# Patient Record
Sex: FEMALE | Race: WHITE | NOT HISPANIC OR LATINO | URBAN - METROPOLITAN AREA
[De-identification: names, ages, dates, MRNs, and addresses within clinical notes are randomized per-mention and may not be internally consistent; named-entity substitution may affect disease eponyms.]

---

## 2023-03-28 DIAGNOSIS — R11.0 NAUSEA: Primary | ICD-10-CM

## 2023-03-28 PROBLEM — I10 ESSENTIAL HYPERTENSION: Status: ACTIVE | Noted: 2023-03-28

## 2023-03-28 PROBLEM — K30 FUNCTIONAL DYSPEPSIA: Status: ACTIVE | Noted: 2023-03-28

## 2023-03-28 RX ORDER — PROMETHAZINE HYDROCHLORIDE 25 MG/1
25 TABLET ORAL EVERY 6 HOURS PRN
COMMUNITY
End: 2023-03-28 | Stop reason: SDUPTHER

## 2023-03-28 RX ORDER — PROMETHAZINE HYDROCHLORIDE 25 MG/1
25 TABLET ORAL EVERY 6 HOURS PRN
Qty: 30 TABLET | Refills: 0 | Status: SHIPPED | OUTPATIENT
Start: 2023-03-28 | End: 2023-12-19 | Stop reason: WASHOUT

## 2023-03-28 NOTE — PROGRESS NOTES
Patient reports persistent nausea over the past week which started while she was taking Paxlovid.  She has been off the Paxlovid for about a week and continues to experience nausea and passage of 2 liquid stools per day.  I have prescribed promethazine 25 mg p.o. every 6 hours as needed for the nausea and also Kaopectate on a as needed basis for the diarrhea.  I also suggested that she provide a stool specimen to the laboratory.  At this time she wishes to hold off.  She will call me in 1 week with her condition or sooner if symptoms worsen

## 2023-07-10 DIAGNOSIS — E87.6 HYPOKALEMIA: Primary | ICD-10-CM

## 2023-07-10 PROBLEM — E78.5 HYPERLIPIDEMIA: Status: ACTIVE | Noted: 2023-07-10

## 2023-07-10 PROBLEM — E55.9 VITAMIN D DEFICIENCY: Status: ACTIVE | Noted: 2023-07-10

## 2023-07-10 PROBLEM — M79.7 FIBROMYALGIA: Status: ACTIVE | Noted: 2023-07-10

## 2023-07-10 PROBLEM — G47.00 INSOMNIA: Status: ACTIVE | Noted: 2023-07-10

## 2023-07-10 PROBLEM — M17.0 PRIMARY OSTEOARTHRITIS OF BOTH KNEES: Status: ACTIVE | Noted: 2023-07-10

## 2023-07-10 PROBLEM — K58.9 IRRITABLE BOWEL SYNDROME: Status: ACTIVE | Noted: 2023-07-10

## 2023-07-10 PROBLEM — J32.9 SINUSITIS: Status: ACTIVE | Noted: 2023-07-10

## 2023-07-10 PROBLEM — R73.03 PREDIABETES: Status: ACTIVE | Noted: 2023-07-10

## 2023-07-10 PROBLEM — J30.9 ALLERGIC RHINITIS: Status: ACTIVE | Noted: 2023-07-10

## 2023-07-10 RX ORDER — POTASSIUM CHLORIDE 20 MEQ/1
20 TABLET, EXTENDED RELEASE ORAL 2 TIMES DAILY
Qty: 60 TABLET | Refills: 2 | Status: SHIPPED | OUTPATIENT
Start: 2023-07-10 | End: 2023-10-25

## 2023-09-23 DIAGNOSIS — I10 ESSENTIAL HYPERTENSION: Primary | ICD-10-CM

## 2023-09-26 RX ORDER — HYDROCHLOROTHIAZIDE 25 MG/1
37.5 TABLET ORAL DAILY
Qty: 135 TABLET | Refills: 3 | Status: SHIPPED | OUTPATIENT
Start: 2023-09-26 | End: 2024-05-17 | Stop reason: DRUGHIGH

## 2023-10-23 DIAGNOSIS — E87.6 HYPOKALEMIA: ICD-10-CM

## 2023-10-25 RX ORDER — POTASSIUM CHLORIDE 20 MEQ/1
20 TABLET, EXTENDED RELEASE ORAL 2 TIMES DAILY
Qty: 60 TABLET | Refills: 2 | Status: SHIPPED | OUTPATIENT
Start: 2023-10-25 | End: 2024-02-02

## 2023-12-18 NOTE — PROGRESS NOTES
Subjective   Patient ID: Zuleima Recinos is a 68 y.o. female who presents with a history of pain in the right upper arm x 1 month    HPI   The patient reports a history of constant aching pain originating over the lateral surface right upper arm extending to the right fifth finger, the neck, and the left upper arm times a few months.  She reports an increase in the intensity the pain with any use of the right arm.  She reports no associated bilateral upper extremity weakness/paresthesias.  She reports no preceding trauma/overexertion.  No other associated symptoms.  The patient has used Advil, Aleve, Tylenol with no change in the frequency or intensity of pain.    She also reports a history of a cough productive of yellow sputum, postnasal drip times a few days.  She reports passage of softer stools once today.  She reports no other associated symptoms.  Review of Systems    Objective   There were no vitals taken for this visit.    Physical Exam  Head-palpation revealed tenderness over the maxillary sinuses bilaterally equally but not over the frontal sinuses  Nose-turbinates not erythematous or swollen, no septal deviation noted..  Mouth-posterior pharynx not erythematous, tonsillar pillars appeared normal, no exudates  Neck-no lymphadenopathy.  Lungs-clear.  Cardiac-rate normal, rhythm regular, no murmurs, no JVD.  Abdomen-soft, nondistended. Normal active bowel sounds. Palpation revealed no tenderness or masses    Musculoskeletal  Right shoulder-no erythema or swelling.  Full range of motion with increased intensity of pain in all directions of motion.  Palpation revealed increased tenderness over the entire shoulder and right upper arm regions, no increase in warmth.  Positive Lopez sign, positive arm crossover test, positive impingement sign, positive empty can test, positive Neer's sign, negative Yergason sign    Neurologic  Upper extremities:  Motor-strength 5/5 in all muscle groups  tested  Sensory  Light touch sensation fully intact  Pinprick sensation slightly diminished right upper arm  Reflexes-2+/4 bilaterally  Assessment/Plan        Assessment  Hypertension-diastolic blood pressure not at goal today  Cough, postnasal drip-May be secondary to viral syndrome, sinusitis, COVID-19  Constant aching pain originating over the lateral surface of the right upper arm extending to the right fifth finger, the neck, left upper arm-unsure of etiology.  May be secondary to bilateral cervical radiculopathies secondary to central canal stenosis cervical spine, bilateral neuroforaminal stenosis cervical spine.  Rotator cuff arthropathy I suppose is a possible etiology as well  Plan  Obtain x-rays of the cervical spine and right shoulder today.  I have urged the patient to take a rapid COVID test as soon as she arrives home today.  Increase hydrochlorothiazide dosage to 50 mg p.o. daily.  If the COVID test is negative, I will start the patient on doxycycline 100 mg p.o. twice daily x 10 days.  She will continue use of Allegra, Flonase, Robitussin DM  Begin Medrol Dosepak as directed.  The patient will call me in 5 days with her condition

## 2023-12-19 ENCOUNTER — OFFICE VISIT (OUTPATIENT)
Dept: PRIMARY CARE | Facility: CLINIC | Age: 68
End: 2023-12-19
Payer: MEDICARE

## 2023-12-19 ENCOUNTER — ANCILLARY PROCEDURE (OUTPATIENT)
Dept: RADIOLOGY | Facility: CLINIC | Age: 68
End: 2023-12-19
Payer: MEDICARE

## 2023-12-19 VITALS — HEART RATE: 80 BPM | SYSTOLIC BLOOD PRESSURE: 140 MMHG | DIASTOLIC BLOOD PRESSURE: 90 MMHG | WEIGHT: 176.4 LBS

## 2023-12-19 DIAGNOSIS — M25.511 CHRONIC RIGHT SHOULDER PAIN: ICD-10-CM

## 2023-12-19 DIAGNOSIS — J01.00 ACUTE NON-RECURRENT MAXILLARY SINUSITIS: Primary | ICD-10-CM

## 2023-12-19 DIAGNOSIS — G89.29 CHRONIC RIGHT SHOULDER PAIN: ICD-10-CM

## 2023-12-19 DIAGNOSIS — I10 ESSENTIAL HYPERTENSION: ICD-10-CM

## 2023-12-19 PROCEDURE — 3077F SYST BP >= 140 MM HG: CPT | Performed by: INTERNAL MEDICINE

## 2023-12-19 PROCEDURE — 1160F RVW MEDS BY RX/DR IN RCRD: CPT | Performed by: INTERNAL MEDICINE

## 2023-12-19 PROCEDURE — 3080F DIAST BP >= 90 MM HG: CPT | Performed by: INTERNAL MEDICINE

## 2023-12-19 PROCEDURE — 72050 X-RAY EXAM NECK SPINE 4/5VWS: CPT | Mod: FY

## 2023-12-19 PROCEDURE — 72050 X-RAY EXAM NECK SPINE 4/5VWS: CPT | Performed by: RADIOLOGY

## 2023-12-19 PROCEDURE — 1125F AMNT PAIN NOTED PAIN PRSNT: CPT | Performed by: INTERNAL MEDICINE

## 2023-12-19 PROCEDURE — 99213 OFFICE O/P EST LOW 20 MIN: CPT | Performed by: INTERNAL MEDICINE

## 2023-12-19 PROCEDURE — 73030 X-RAY EXAM OF SHOULDER: CPT | Mod: RT,FY

## 2023-12-19 PROCEDURE — 73030 X-RAY EXAM OF SHOULDER: CPT | Mod: RIGHT SIDE | Performed by: RADIOLOGY

## 2023-12-19 PROCEDURE — 1159F MED LIST DOCD IN RCRD: CPT | Performed by: INTERNAL MEDICINE

## 2023-12-19 RX ORDER — FAMOTIDINE 20 MG/1
20 TABLET, FILM COATED ORAL 2 TIMES DAILY
COMMUNITY
Start: 2020-01-27

## 2023-12-19 RX ORDER — DOXYCYCLINE 100 MG/1
100 CAPSULE ORAL 2 TIMES DAILY
Qty: 20 CAPSULE | Refills: 0 | Status: SHIPPED | OUTPATIENT
Start: 2023-12-19 | End: 2023-12-29

## 2023-12-19 RX ORDER — FLUTICASONE PROPIONATE 50 MCG
2 SPRAY, SUSPENSION (ML) NASAL DAILY
COMMUNITY
Start: 2014-04-22

## 2023-12-19 RX ORDER — FEXOFENADINE HCL 60 MG
60 TABLET ORAL 2 TIMES DAILY
COMMUNITY

## 2023-12-19 RX ORDER — METHYLPREDNISOLONE 4 MG/1
TABLET ORAL
Qty: 21 TABLET | Refills: 0 | Status: SHIPPED | OUTPATIENT
Start: 2023-12-19 | End: 2023-12-26

## 2023-12-19 RX ORDER — ASPIRIN 81 MG/1
81 TABLET ORAL 2 TIMES DAILY
COMMUNITY
Start: 2023-02-15 | End: 2023-12-19 | Stop reason: WASHOUT

## 2023-12-19 RX ORDER — ALBUTEROL SULFATE 90 UG/1
2 AEROSOL, METERED RESPIRATORY (INHALATION) EVERY 6 HOURS PRN
COMMUNITY
Start: 2014-04-22

## 2024-02-01 DIAGNOSIS — E87.6 HYPOKALEMIA: ICD-10-CM

## 2024-02-02 RX ORDER — POTASSIUM CHLORIDE 20 MEQ/1
TABLET, EXTENDED RELEASE ORAL
Qty: 60 TABLET | Refills: 2 | Status: SHIPPED | OUTPATIENT
Start: 2024-02-02 | End: 2024-05-01

## 2024-05-01 DIAGNOSIS — E87.6 HYPOKALEMIA: ICD-10-CM

## 2024-05-01 RX ORDER — POTASSIUM CHLORIDE 20 MEQ/1
TABLET, EXTENDED RELEASE ORAL
Qty: 60 TABLET | Refills: 6 | Status: SHIPPED | OUTPATIENT
Start: 2024-05-01

## 2024-05-16 ENCOUNTER — TELEPHONE (OUTPATIENT)
Dept: PRIMARY CARE | Facility: CLINIC | Age: 69
End: 2024-05-16
Payer: COMMERCIAL

## 2024-05-16 DIAGNOSIS — M48.02 CERVICAL STENOSIS OF SPINE: Primary | ICD-10-CM

## 2024-05-16 RX ORDER — CELECOXIB 100 MG/1
100 CAPSULE ORAL 2 TIMES DAILY
Qty: 60 CAPSULE | Refills: 1 | Status: SHIPPED | OUTPATIENT
Start: 2024-05-16

## 2024-05-16 RX ORDER — CELECOXIB 100 MG/1
100 CAPSULE ORAL 2 TIMES DAILY
COMMUNITY
End: 2024-05-16 | Stop reason: SDUPTHER

## 2024-05-16 NOTE — TELEPHONE ENCOUNTER
Patient states that she saw you back in December about arm pain, you prescribed something for it, it didn't help and she never followed back up. She would like a call back to discuss possible different med?

## 2024-05-16 NOTE — PROGRESS NOTES
Patient reports continued symptoms in the neck, right arm, left upper arm.  She may have bilateral cervical radiculopathies secondary to bilateral neuroforaminal stenosis cervical spine, central canal stenosis cervical spine, she has been exercising but has noted no change in the frequency or intensity of pain since her last office visit in mid December.  I will arrange for an MRI of the cervical spine.  I also will start the patient on celecoxib 100 mg p.o. twice daily

## 2024-05-17 DIAGNOSIS — I10 ESSENTIAL HYPERTENSION: ICD-10-CM

## 2024-05-17 RX ORDER — HYDROCHLOROTHIAZIDE 50 MG/1
50 TABLET ORAL DAILY
Qty: 90 TABLET | Refills: 3 | Status: SHIPPED | OUTPATIENT
Start: 2024-05-17 | End: 2024-05-21 | Stop reason: SDUPTHER

## 2024-05-21 DIAGNOSIS — I10 ESSENTIAL HYPERTENSION: ICD-10-CM

## 2024-05-21 RX ORDER — HYDROCHLOROTHIAZIDE 50 MG/1
50 TABLET ORAL DAILY
Qty: 90 TABLET | Refills: 3 | Status: SHIPPED | OUTPATIENT
Start: 2024-05-21 | End: 2024-05-23 | Stop reason: SDUPTHER

## 2024-05-21 NOTE — TELEPHONE ENCOUNTER
Patient needs Hydrocholrothiazide resent to pharmacy but it will not let me send it over to you again. Patient states pharmacy never received the script.

## 2024-05-23 DIAGNOSIS — I10 ESSENTIAL HYPERTENSION: ICD-10-CM

## 2024-05-23 RX ORDER — HYDROCHLOROTHIAZIDE 50 MG/1
50 TABLET ORAL DAILY
Qty: 90 TABLET | Refills: 3 | Status: SHIPPED | OUTPATIENT
Start: 2024-05-23 | End: 2024-05-23 | Stop reason: SDUPTHER

## 2024-05-23 RX ORDER — HYDROCHLOROTHIAZIDE 50 MG/1
50 TABLET ORAL DAILY
Qty: 90 TABLET | Refills: 3 | Status: SHIPPED | OUTPATIENT
Start: 2024-05-23

## 2024-05-31 ENCOUNTER — HOSPITAL ENCOUNTER (OUTPATIENT)
Dept: RADIOLOGY | Facility: CLINIC | Age: 69
Discharge: HOME | End: 2024-05-31
Payer: COMMERCIAL

## 2024-05-31 ENCOUNTER — TELEPHONE (OUTPATIENT)
Dept: PRIMARY CARE | Facility: CLINIC | Age: 69
End: 2024-05-31
Payer: COMMERCIAL

## 2024-05-31 DIAGNOSIS — M48.02 CERVICAL STENOSIS OF SPINE: ICD-10-CM

## 2024-05-31 DIAGNOSIS — M48.02 CERVICAL SPINAL STENOSIS: Primary | ICD-10-CM

## 2024-05-31 PROCEDURE — 72141 MRI NECK SPINE W/O DYE: CPT | Performed by: RADIOLOGY

## 2024-05-31 PROCEDURE — 72141 MRI NECK SPINE W/O DYE: CPT

## 2024-06-20 ENCOUNTER — APPOINTMENT (OUTPATIENT)
Dept: PAIN MEDICINE | Facility: CLINIC | Age: 69
End: 2024-06-20
Payer: COMMERCIAL

## 2024-06-20 VITALS
HEIGHT: 62 IN | DIASTOLIC BLOOD PRESSURE: 86 MMHG | SYSTOLIC BLOOD PRESSURE: 152 MMHG | HEART RATE: 89 BPM | WEIGHT: 180 LBS | RESPIRATION RATE: 17 BRPM | BODY MASS INDEX: 33.13 KG/M2

## 2024-06-20 DIAGNOSIS — M54.40 CHRONIC BILATERAL LOW BACK PAIN WITH SCIATICA, SCIATICA LATERALITY UNSPECIFIED: ICD-10-CM

## 2024-06-20 DIAGNOSIS — G89.29 CHRONIC BILATERAL LOW BACK PAIN WITH SCIATICA, SCIATICA LATERALITY UNSPECIFIED: ICD-10-CM

## 2024-06-20 DIAGNOSIS — M48.02 CERVICAL SPINAL STENOSIS: ICD-10-CM

## 2024-06-20 PROCEDURE — 1125F AMNT PAIN NOTED PAIN PRSNT: CPT | Performed by: PAIN MEDICINE

## 2024-06-20 PROCEDURE — 1036F TOBACCO NON-USER: CPT | Performed by: PAIN MEDICINE

## 2024-06-20 PROCEDURE — 3079F DIAST BP 80-89 MM HG: CPT | Performed by: PAIN MEDICINE

## 2024-06-20 PROCEDURE — 99204 OFFICE O/P NEW MOD 45 MIN: CPT | Performed by: PAIN MEDICINE

## 2024-06-20 PROCEDURE — 3077F SYST BP >= 140 MM HG: CPT | Performed by: PAIN MEDICINE

## 2024-06-20 RX ORDER — TOPIRAMATE 25 MG/1
TABLET ORAL 2 TIMES DAILY
Qty: 280 TABLET | Refills: 0 | Status: SHIPPED | OUTPATIENT
Start: 2024-06-20 | End: 2024-08-15

## 2024-06-20 SDOH — SOCIAL STABILITY: SOCIAL NETWORK: SOCIAL ACTIVITY:: 4

## 2024-06-20 ASSESSMENT — PAIN DESCRIPTION - DESCRIPTORS: DESCRIPTORS: ACHING;NAGGING

## 2024-06-20 ASSESSMENT — PAIN SCALES - GENERAL
PAINLEVEL: 8
PAINLEVEL_OUTOF10: 8

## 2024-06-20 ASSESSMENT — ENCOUNTER SYMPTOMS: PAIN: 1

## 2024-06-20 ASSESSMENT — PAIN - FUNCTIONAL ASSESSMENT: PAIN_FUNCTIONAL_ASSESSMENT: 0-10

## 2024-06-20 NOTE — PROGRESS NOTES
Subjective   Patient ID: Zuleima Recinos is a 68 y.o. female who presents for Pain (Pt here today c/o pain in bilateral shoulder and somewhat in neck. No recent PT, recent images. Currently take OTC Tylenol ).    Pain        Ms. Recinos is pleasant 68 y  lady came in today as new patient today. She has history of fibromyalgia came with chief complain of neck , bilateral arm pain and tinglings of both hands mainly medial two fingers. Pain started 6 month ago and has progressed. No inciting event. Pain sort of constant, gets up and down over the day.she no weakness not urine incontinence . Takes tylenol with no much help. She is doing her home stretch . Her new MRI shows multiple foraminal stenosis.       Review of Systems   All other systems reviewed and are negative.         Current Outpatient Medications:     albuterol (ProAir HFA) 90 mcg/actuation inhaler, Inhale 2 puffs every 6 hours if needed., Disp: , Rfl:     celecoxib (CeleBREX) 100 mg capsule, Take 1 capsule (100 mg) by mouth 2 times a day., Disp: 60 capsule, Rfl: 1    famotidine (Pepcid) 20 mg tablet, Take 1 tablet (20 mg) by mouth 2 times a day., Disp: , Rfl:     fexofenadine (Allegra) 60 mg tablet, Take 1 tablet (60 mg) by mouth 2 times a day., Disp: , Rfl:     fluticasone (Flonase) 50 mcg/actuation nasal spray, Administer 2 sprays into affected nostril(s) once daily., Disp: , Rfl:     hydroCHLOROthiazide (HYDRODiuril) 50 mg tablet, Take 1 tablet (50 mg) by mouth once daily. Take 1 tablet daily, Disp: 90 tablet, Rfl: 3    potassium chloride CR 20 mEq ER tablet, TAKE 1 TABLET(20 MEQ) BY MOUTH TWICE DAILY, Disp: 60 tablet, Rfl: 6     Past Medical History:   Diagnosis Date    Functional dyspepsia 05/22/2018    Nonulcer dyspepsia    Personal history of other diseases of the circulatory system 12/16/2017    History of hypertension    Personal history of other diseases of the digestive system 09/10/2019    History of esophageal reflux    Personal  history of other diseases of the musculoskeletal system and connective tissue 12/16/2017    History of fibromyositis        Past Surgical History:   Procedure Laterality Date    OTHER SURGICAL HISTORY  02/01/2021    Hysterectomy        No family history on file.     Allergies   Allergen Reactions    Cefuroxime Unknown    Evolocumab Rash        Objective     Vitals:    06/20/24 0958   BP: 152/86   Pulse: 89   Resp: 17        === 05/31/24 ===    MR CERVICAL SPINE WO CONTRAST    - Impression -  There is multilevel spondylosis with varying degrees of spinal canal  and neural foraminal narrowing as described above. The most  pronounced spinal canal narrowing is noted at the C4/5 level.    MACRO:  None.    Signed by: Julien Hutchins 5/31/2024 12:57 PM  Dictation workstation:   [id number]     Physical Exam    GENERAL EXAM  Vital Signs: Vital signs to include heart rate, respiration rate, blood pressure, and temperature were reviewed.  General Appearance:  Awake, alert, healthy appearing, well developed, No acute distress.  Head: Normocephalic without evidence of head injury.  Neck: The appearance of the neck was normal without swelling with a midline trachea.  Eyes: The eyelids and eyebrows exhibited no abnormalities.  Pupils were not pin-point.  Sclera was without icterus.  Lungs: Respiration rhythm and depth was normal.  Respiratory movements were normal without labored breathing.  Cardiovascular: No peripheral edema was present.    Neurological: Patient was oriented to time, place, and person.  Speech was normal.  Balance, gait, and stance were unremarkable.    Tenderness over the cervical facet joints area louisa  Tenderness of  subacromial bursa worse on the rt comparing to the left  Psychiatric: Appearance was normal with appropriate dress.  Mood was euthymic and affect was normal.  Skin: Affected regions were without ecchymosis or skin lesions.        Physical exam as above except:        Assessment/Plan   Cervical  spondylosis  Cervical radiculopathy    Plan  GAY  Topamax titration  aquatherapy

## 2024-06-25 DIAGNOSIS — M79.7 FIBROMYALGIA: ICD-10-CM

## 2024-06-25 RX ORDER — TOPIRAMATE 100 MG/1
100 TABLET, FILM COATED ORAL 2 TIMES DAILY
Qty: 60 TABLET | Refills: 2 | Status: CANCELLED | OUTPATIENT
Start: 2024-06-25 | End: 2024-09-23

## 2024-06-25 NOTE — TELEPHONE ENCOUNTER
Pt called LM on  reporting she needs prior auth for medication recently prescribed Topriamate titration. Pt reports she doesn't know why she needs so many and doesn't really know if she wants to take or if she will have side effects of the medication. I sent in a prior auth for the medication. But since call I did cancel the medication request. Call patient back to review over why initially she would need so many tablets. LM on  if she is still interested in taking it I can resend medication otherwise I cancelled it.

## 2024-06-25 NOTE — TELEPHONE ENCOUNTER
Pt called for medication refill Topiramate 100 mg take one tablet BID to be filled to preferred pharmacy.

## 2024-07-03 ENCOUNTER — APPOINTMENT (OUTPATIENT)
Dept: PRIMARY CARE | Facility: CLINIC | Age: 69
End: 2024-07-03
Payer: COMMERCIAL

## 2024-07-04 NOTE — PROGRESS NOTES
Subjective   Patient ID: Zuleima Recinos is a 68 y.o. female who presents for follow-up visit    HPI   The patient reports a recent history of generalized fatigue and weakness as well as bilateral upper and lower extremity weakness.  She still reports continued chronic constant aching pain over the lateral surface of the right upper arm extending to the right fifth finger, neck and left upper arm regions.  She still reports an increase in the intensity of the pain with use of the right arm.  She reports no other associated symptoms.  She is scheduled for an epidural July 10.    The patient does report continued chronic dyspnea times years walking up stairs but unchanged over the past few weeks.  She does report a history of frequent belching times a few weeks.  Over the past few weeks, she also reports experiencing frequent episodes of acid in the chest radiating to the throat times a few weeks as well as intermittent episodes of generalized crampy abdominal pain times a few weeks.  She reports that the duration of each episode of pain has been 4 hours.  She reports that the episodes improve with passage of flatus, defecation.  She reports no recent associated constipation, diarrhea.  No other associated symptoms  The patient reports continued chronic insomnia times years manifested as difficulty falling asleep and staying and falling back asleep.  She reports no associated symptoms.  Over the past few weeks she does report that she has had more difficulty falling asleep, staying asleep, and falling back asleep despite the fact that she has been using Benadryl 25 mg p.o. nightly and melatonin 5-10 mg p.o. nightly.  The patient did not begin use of topiramate as the topiramate was not covered on her insurance plan.  Review of Systems    Objective   There were no vitals taken for this visit.    Physical Exam  Neck-no lymphadenopathy. Thyroid gland not enlarged, no bruits  Lungs-clear.  Cardiac-rate normal, rhythm  regular, no murmurs, no JVD.  Abdomen-soft, nondistended. Normal active bowel sounds. Palpation revealed no tenderness or masses..  Extremities-no peripheral edema  Neurologic  Upper and lower extremities:  Motor-strength 5/5 in all muscle groups tested  Sensory  Light touch and pinprick sensation fully intact  Reflexes-2+/4 bilaterally  Assessment/Plan   Problem List Items Addressed This Visit             ICD-10-CM    Functional dyspepsia K30    Essential hypertension - Primary I10    Relevant Medications    topiramate (Topamax) 25 mg tablet    amLODIPine-benazepriL (Lotrel) 2.5-10 mg capsule    famotidine (Pepcid) 40 mg tablet    dicyclomine (Bentyl) 10 mg capsule    Other Relevant Orders    CBC and Auto Differential    Comprehensive Metabolic Panel    C-Reactive Protein, High Sensitivity    Hemoglobin A1C    Lipid Panel    Thyroid Stimulating Hormone    Vitamin B12    Vitamin D 25-Hydroxy,Total (for eval of Vitamin D levels)    Hyperlipidemia E78.5    Relevant Medications    topiramate (Topamax) 25 mg tablet    amLODIPine-benazepriL (Lotrel) 2.5-10 mg capsule    famotidine (Pepcid) 40 mg tablet    dicyclomine (Bentyl) 10 mg capsule    Other Relevant Orders    CBC and Auto Differential    Comprehensive Metabolic Panel    C-Reactive Protein, High Sensitivity    Hemoglobin A1C    Lipid Panel    Thyroid Stimulating Hormone    Vitamin B12    Vitamin D 25-Hydroxy,Total (for eval of Vitamin D levels)    Hypokalemia E87.6    Relevant Medications    topiramate (Topamax) 25 mg tablet    amLODIPine-benazepriL (Lotrel) 2.5-10 mg capsule    famotidine (Pepcid) 40 mg tablet    dicyclomine (Bentyl) 10 mg capsule    Other Relevant Orders    CBC and Auto Differential    Comprehensive Metabolic Panel    C-Reactive Protein, High Sensitivity    Hemoglobin A1C    Lipid Panel    Thyroid Stimulating Hormone    Vitamin B12    Vitamin D 25-Hydroxy,Total (for eval of Vitamin D levels)    Irritable bowel syndrome K58.9    Relevant  Medications    topiramate (Topamax) 25 mg tablet    amLODIPine-benazepriL (Lotrel) 2.5-10 mg capsule    famotidine (Pepcid) 40 mg tablet    dicyclomine (Bentyl) 10 mg capsule    Prediabetes R73.03    Relevant Medications    topiramate (Topamax) 25 mg tablet    amLODIPine-benazepriL (Lotrel) 2.5-10 mg capsule    famotidine (Pepcid) 40 mg tablet    dicyclomine (Bentyl) 10 mg capsule    Other Relevant Orders    CBC and Auto Differential    Comprehensive Metabolic Panel    C-Reactive Protein, High Sensitivity    Hemoglobin A1C    Lipid Panel    Thyroid Stimulating Hormone    Vitamin B12    Vitamin D 25-Hydroxy,Total (for eval of Vitamin D levels)    Vitamin D deficiency E55.9    Relevant Medications    topiramate (Topamax) 25 mg tablet    amLODIPine-benazepriL (Lotrel) 2.5-10 mg capsule    famotidine (Pepcid) 40 mg tablet    dicyclomine (Bentyl) 10 mg capsule    Other Relevant Orders    Vitamin D 25-Hydroxy,Total (for eval of Vitamin D levels)     Other Visit Diagnoses         Codes    Chronic fatigue     R53.82    Relevant Medications    topiramate (Topamax) 25 mg tablet    amLODIPine-benazepriL (Lotrel) 2.5-10 mg capsule    famotidine (Pepcid) 40 mg tablet    dicyclomine (Bentyl) 10 mg capsule    Other Relevant Orders    CBC and Auto Differential    Comprehensive Metabolic Panel    C-Reactive Protein, High Sensitivity    Hemoglobin A1C    Lipid Panel    Thyroid Stimulating Hormone    Vitamin B12    Vitamin D 25-Hydroxy,Total (for eval of Vitamin D levels)             Assessment  Hypertension-blood pressure not at goal  Frequent belching-May be secondary to gastroesophageal reflux  Intermittent episodes of acid in the chest and throat-May be secondary to gastroesophageal reflux  Gastroesophageal reflux  Nonalcoholic fatty liver disease  Intermittent episodes of cramping pain over the entire abdomen-May be secondary to flare of IBS  IBS  Generalized fatigue and weakness-unsure of etiology.  May be secondary to worsening  insomnia, hypokalemia, bilateral neuroforaminal stenosis cervical spine at multiple levels  Bilateral neuroforaminal stenosis cervical spine at multiple levels.  Plan  Obtain CBC differential, CMP, fasting lipid profile, TSH, vitamin D level, vitamin B12 level, urinalysis, cardiac CRP is soon as possible.  Begin amlodipine/benazepril 2.5/10 1 capsule daily  Increase famotidine dosage to 40 mg p.o. twice daily  Begin dicyclomine 10 mg p.o. every 6 hours as needed.  I will again attempt to prescribe topiramate to be used at a dose of 25 mg p.o. nightly  The patient will return for a blood pressure check in 3 weeks

## 2024-07-05 ENCOUNTER — LAB (OUTPATIENT)
Dept: LAB | Facility: LAB | Age: 69
End: 2024-07-05
Payer: MEDICARE

## 2024-07-05 ENCOUNTER — APPOINTMENT (OUTPATIENT)
Dept: PRIMARY CARE | Facility: CLINIC | Age: 69
End: 2024-07-05
Payer: COMMERCIAL

## 2024-07-05 VITALS
BODY MASS INDEX: 34.87 KG/M2 | WEIGHT: 187.6 LBS | DIASTOLIC BLOOD PRESSURE: 96 MMHG | SYSTOLIC BLOOD PRESSURE: 160 MMHG | HEART RATE: 88 BPM

## 2024-07-05 DIAGNOSIS — I10 ESSENTIAL HYPERTENSION: ICD-10-CM

## 2024-07-05 DIAGNOSIS — R73.03 PREDIABETES: ICD-10-CM

## 2024-07-05 DIAGNOSIS — R53.82 CHRONIC FATIGUE: ICD-10-CM

## 2024-07-05 DIAGNOSIS — E78.00 PURE HYPERCHOLESTEROLEMIA: ICD-10-CM

## 2024-07-05 DIAGNOSIS — K58.2 IRRITABLE BOWEL SYNDROME WITH BOTH CONSTIPATION AND DIARRHEA: ICD-10-CM

## 2024-07-05 DIAGNOSIS — E55.9 VITAMIN D DEFICIENCY: ICD-10-CM

## 2024-07-05 DIAGNOSIS — Z00.00 ROUTINE GENERAL MEDICAL EXAMINATION AT A HEALTH CARE FACILITY: ICD-10-CM

## 2024-07-05 DIAGNOSIS — I10 ESSENTIAL HYPERTENSION: Primary | ICD-10-CM

## 2024-07-05 DIAGNOSIS — E87.6 HYPOKALEMIA: ICD-10-CM

## 2024-07-05 DIAGNOSIS — K30 FUNCTIONAL DYSPEPSIA: ICD-10-CM

## 2024-07-05 LAB
25(OH)D3 SERPL-MCNC: 30 NG/ML (ref 30–100)
ALBUMIN SERPL BCP-MCNC: 5.2 G/DL (ref 3.4–5)
ALP SERPL-CCNC: 58 U/L (ref 33–136)
ALT SERPL W P-5'-P-CCNC: 29 U/L (ref 7–45)
ANION GAP SERPL CALC-SCNC: 14 MMOL/L (ref 10–20)
AST SERPL W P-5'-P-CCNC: 26 U/L (ref 9–39)
BASOPHILS # BLD AUTO: 0.02 X10*3/UL (ref 0–0.1)
BASOPHILS NFR BLD AUTO: 0.3 %
BILIRUB SERPL-MCNC: 0.6 MG/DL (ref 0–1.2)
BUN SERPL-MCNC: 20 MG/DL (ref 6–23)
CALCIUM SERPL-MCNC: 10.5 MG/DL (ref 8.6–10.6)
CHLORIDE SERPL-SCNC: 98 MMOL/L (ref 98–107)
CHOLEST SERPL-MCNC: 387 MG/DL (ref 0–199)
CHOLESTEROL/HDL RATIO: 10.4
CO2 SERPL-SCNC: 28 MMOL/L (ref 21–32)
CREAT SERPL-MCNC: 1.03 MG/DL (ref 0.5–1.05)
CRP SERPL HS-MCNC: 7.6 MG/L
EGFRCR SERPLBLD CKD-EPI 2021: 59 ML/MIN/1.73M*2
EOSINOPHIL # BLD AUTO: 0.06 X10*3/UL (ref 0–0.7)
EOSINOPHIL NFR BLD AUTO: 0.8 %
ERYTHROCYTE [DISTWIDTH] IN BLOOD BY AUTOMATED COUNT: 13 % (ref 11.5–14.5)
EST. AVERAGE GLUCOSE BLD GHB EST-MCNC: 123 MG/DL
GLUCOSE SERPL-MCNC: 123 MG/DL (ref 74–99)
HBA1C MFR BLD: 5.9 %
HCT VFR BLD AUTO: 36.6 % (ref 36–46)
HDLC SERPL-MCNC: 37.2 MG/DL
HGB BLD-MCNC: 12.4 G/DL (ref 12–16)
IMM GRANULOCYTES # BLD AUTO: 0.03 X10*3/UL (ref 0–0.7)
IMM GRANULOCYTES NFR BLD AUTO: 0.4 % (ref 0–0.9)
LDLC SERPL CALC-MCNC: ABNORMAL MG/DL
LDLC SERPL DIRECT ASSAY-MCNC: 262 MG/DL (ref 0–129)
LYMPHOCYTES # BLD AUTO: 2.04 X10*3/UL (ref 1.2–4.8)
LYMPHOCYTES NFR BLD AUTO: 28.1 %
MCH RBC QN AUTO: 31.6 PG (ref 26–34)
MCHC RBC AUTO-ENTMCNC: 33.9 G/DL (ref 32–36)
MCV RBC AUTO: 93 FL (ref 80–100)
MONOCYTES # BLD AUTO: 0.71 X10*3/UL (ref 0.1–1)
MONOCYTES NFR BLD AUTO: 9.8 %
NEUTROPHILS # BLD AUTO: 4.39 X10*3/UL (ref 1.2–7.7)
NEUTROPHILS NFR BLD AUTO: 60.6 %
NON HDL CHOLESTEROL: 350 MG/DL (ref 0–149)
NRBC BLD-RTO: 0 /100 WBCS (ref 0–0)
PLATELET # BLD AUTO: 246 X10*3/UL (ref 150–450)
POC APPEARANCE, URINE: CLEAR
POC BILIRUBIN, URINE: NEGATIVE
POC BLOOD, URINE: NEGATIVE
POC COLOR, URINE: YELLOW
POC GLUCOSE, URINE: NEGATIVE MG/DL
POC KETONES, URINE: NEGATIVE MG/DL
POC LEUKOCYTES, URINE: NEGATIVE
POC NITRITE,URINE: NEGATIVE
POC PH, URINE: 5.5 PH
POC PROTEIN, URINE: NEGATIVE MG/DL
POC SPECIFIC GRAVITY, URINE: 1.02
POC UROBILINOGEN, URINE: 0.2 EU/DL
POTASSIUM SERPL-SCNC: 3.8 MMOL/L (ref 3.5–5.3)
PROT SERPL-MCNC: 7.9 G/DL (ref 6.4–8.2)
RBC # BLD AUTO: 3.92 X10*6/UL (ref 4–5.2)
SODIUM SERPL-SCNC: 136 MMOL/L (ref 136–145)
TRIGL SERPL-MCNC: 437 MG/DL (ref 0–149)
TSH SERPL-ACNC: 2.05 MIU/L (ref 0.44–3.98)
VIT B12 SERPL-MCNC: 376 PG/ML (ref 211–911)
VLDL: ABNORMAL
WBC # BLD AUTO: 7.3 X10*3/UL (ref 4.4–11.3)

## 2024-07-05 PROCEDURE — 99214 OFFICE O/P EST MOD 30 MIN: CPT | Performed by: INTERNAL MEDICINE

## 2024-07-05 PROCEDURE — 86141 C-REACTIVE PROTEIN HS: CPT

## 2024-07-05 PROCEDURE — 1159F MED LIST DOCD IN RCRD: CPT | Performed by: INTERNAL MEDICINE

## 2024-07-05 PROCEDURE — 3080F DIAST BP >= 90 MM HG: CPT | Performed by: INTERNAL MEDICINE

## 2024-07-05 PROCEDURE — 82607 VITAMIN B-12: CPT

## 2024-07-05 PROCEDURE — 1160F RVW MEDS BY RX/DR IN RCRD: CPT | Performed by: INTERNAL MEDICINE

## 2024-07-05 PROCEDURE — 84443 ASSAY THYROID STIM HORMONE: CPT

## 2024-07-05 PROCEDURE — 80053 COMPREHEN METABOLIC PANEL: CPT

## 2024-07-05 PROCEDURE — 3077F SYST BP >= 140 MM HG: CPT | Performed by: INTERNAL MEDICINE

## 2024-07-05 PROCEDURE — 85025 COMPLETE CBC W/AUTO DIFF WBC: CPT

## 2024-07-05 PROCEDURE — 80061 LIPID PANEL: CPT

## 2024-07-05 PROCEDURE — 83721 ASSAY OF BLOOD LIPOPROTEIN: CPT

## 2024-07-05 PROCEDURE — 82306 VITAMIN D 25 HYDROXY: CPT

## 2024-07-05 PROCEDURE — 83036 HEMOGLOBIN GLYCOSYLATED A1C: CPT

## 2024-07-05 PROCEDURE — 81002 URINALYSIS NONAUTO W/O SCOPE: CPT | Performed by: INTERNAL MEDICINE

## 2024-07-05 RX ORDER — TOPIRAMATE 25 MG/1
25 TABLET ORAL NIGHTLY
Qty: 30 TABLET | Refills: 5 | Status: SHIPPED | OUTPATIENT
Start: 2024-07-05 | End: 2024-07-05

## 2024-07-05 RX ORDER — AMLODIPINE BESYLATE AND BENAZEPRIL HYDROCHLORIDE 2.5; 1 MG/1; MG/1
1 CAPSULE ORAL DAILY
Qty: 100 CAPSULE | Refills: 3 | Status: SHIPPED | OUTPATIENT
Start: 2024-07-05 | End: 2025-08-09

## 2024-07-05 RX ORDER — DICYCLOMINE HYDROCHLORIDE 10 MG/1
10 CAPSULE ORAL EVERY 6 HOURS PRN
Qty: 30 CAPSULE | Refills: 0 | Status: SHIPPED | OUTPATIENT
Start: 2024-07-05 | End: 2024-09-03

## 2024-07-05 RX ORDER — FAMOTIDINE 40 MG/1
TABLET, FILM COATED ORAL
Qty: 180 TABLET | Refills: 2 | Status: SHIPPED | OUTPATIENT
Start: 2024-07-05

## 2024-07-05 RX ORDER — MELATONIN 5 MG
CAPSULE ORAL
COMMUNITY

## 2024-07-05 RX ORDER — TOPIRAMATE 25 MG/1
TABLET ORAL
Qty: 90 TABLET | Refills: 1 | Status: SHIPPED | OUTPATIENT
Start: 2024-07-05

## 2024-07-05 RX ORDER — CHOLECALCIFEROL (VITAMIN D3) 50 MCG
50 TABLET ORAL DAILY
COMMUNITY

## 2024-07-05 RX ORDER — FAMOTIDINE 40 MG/1
40 TABLET, FILM COATED ORAL 2 TIMES DAILY
Qty: 60 TABLET | Refills: 5 | Status: SHIPPED | OUTPATIENT
Start: 2024-07-05 | End: 2024-07-05

## 2024-07-06 ENCOUNTER — TELEPHONE (OUTPATIENT)
Dept: PRIMARY CARE | Facility: CLINIC | Age: 69
End: 2024-07-06
Payer: COMMERCIAL

## 2024-07-06 DIAGNOSIS — E78.00 PURE HYPERCHOLESTEROLEMIA: ICD-10-CM

## 2024-07-06 DIAGNOSIS — I10 ESSENTIAL HYPERTENSION: Primary | ICD-10-CM

## 2024-07-06 DIAGNOSIS — R73.03 PREDIABETES: ICD-10-CM

## 2024-07-07 NOTE — TELEPHONE ENCOUNTER
Labs reviewed.  I have referred the patient to the [facility] clinic for further evaluation and treatment

## 2024-07-19 ENCOUNTER — TELEPHONE (OUTPATIENT)
Dept: PRIMARY CARE | Facility: CLINIC | Age: 69
End: 2024-07-19
Payer: COMMERCIAL

## 2024-07-19 NOTE — TELEPHONE ENCOUNTER
Patient would like to speak with you about topromax. States she's tired all the time, still not helping, still in a lot of pain. Never got epidural on the 10th because they moved her appt to [place] last minute instead of [facility]. Not able to get epidural until next Wednesday and is still in a lot of pain.

## 2024-07-24 ENCOUNTER — HOSPITAL ENCOUNTER (OUTPATIENT)
Dept: OPERATING ROOM | Facility: CLINIC | Age: 69
Discharge: HOME | End: 2024-07-24
Payer: COMMERCIAL

## 2024-07-24 VITALS
WEIGHT: 185.41 LBS | SYSTOLIC BLOOD PRESSURE: 120 MMHG | RESPIRATION RATE: 16 BRPM | HEIGHT: 61 IN | OXYGEN SATURATION: 98 % | TEMPERATURE: 97.5 F | BODY MASS INDEX: 35.01 KG/M2 | DIASTOLIC BLOOD PRESSURE: 58 MMHG | HEART RATE: 70 BPM

## 2024-07-24 DIAGNOSIS — M48.02 CERVICAL SPINAL STENOSIS: ICD-10-CM

## 2024-07-24 PROCEDURE — 2500000004 HC RX 250 GENERAL PHARMACY W/ HCPCS (ALT 636 FOR OP/ED): Mod: JG | Performed by: PAIN MEDICINE

## 2024-07-24 PROCEDURE — 62323 NJX INTERLAMINAR LMBR/SAC: CPT | Performed by: PAIN MEDICINE

## 2024-07-24 PROCEDURE — 3600000006 HC OR TIME - EACH INCREMENTAL 1 MINUTE - PROCEDURE LEVEL ONE

## 2024-07-24 PROCEDURE — 2550000001 HC RX 255 CONTRASTS: Performed by: PAIN MEDICINE

## 2024-07-24 PROCEDURE — 2500000005 HC RX 250 GENERAL PHARMACY W/O HCPCS: Performed by: PAIN MEDICINE

## 2024-07-24 PROCEDURE — 7100000009 HC PHASE TWO TIME - INITIAL BASE CHARGE

## 2024-07-24 PROCEDURE — 7100000010 HC PHASE TWO TIME - EACH INCREMENTAL 1 MINUTE

## 2024-07-24 PROCEDURE — 3600000001 HC OR TIME - INITIAL BASE CHARGE - PROCEDURE LEVEL ONE

## 2024-07-24 RX ORDER — DEXAMETHASONE SODIUM PHOSPHATE 10 MG/ML
INJECTION INTRAMUSCULAR; INTRAVENOUS AS NEEDED
Status: COMPLETED | OUTPATIENT
Start: 2024-07-24 | End: 2024-07-24

## 2024-07-24 RX ORDER — SODIUM CHLORIDE 9 MG/ML
INJECTION, SOLUTION INTRAMUSCULAR; INTRAVENOUS; SUBCUTANEOUS AS NEEDED
Status: COMPLETED | OUTPATIENT
Start: 2024-07-24 | End: 2024-07-24

## 2024-07-24 RX ORDER — MIDAZOLAM HYDROCHLORIDE 1 MG/ML
INJECTION, SOLUTION INTRAMUSCULAR; INTRAVENOUS AS NEEDED
Status: COMPLETED | OUTPATIENT
Start: 2024-07-24 | End: 2024-07-24

## 2024-07-24 RX ORDER — BUPIVACAINE HYDROCHLORIDE 5 MG/ML
INJECTION, SOLUTION EPIDURAL; INTRACAUDAL AS NEEDED
Status: COMPLETED | OUTPATIENT
Start: 2024-07-24 | End: 2024-07-24

## 2024-07-24 RX ORDER — ACETAMINOPHEN 500 MG
1000 TABLET ORAL EVERY 6 HOURS PRN
COMMUNITY

## 2024-07-24 ASSESSMENT — COLUMBIA-SUICIDE SEVERITY RATING SCALE - C-SSRS
1. IN THE PAST MONTH, HAVE YOU WISHED YOU WERE DEAD OR WISHED YOU COULD GO TO SLEEP AND NOT WAKE UP?: NO
6. HAVE YOU EVER DONE ANYTHING, STARTED TO DO ANYTHING, OR PREPARED TO DO ANYTHING TO END YOUR LIFE?: NO
2. HAVE YOU ACTUALLY HAD ANY THOUGHTS OF KILLING YOURSELF?: NO

## 2024-07-24 ASSESSMENT — PAIN - FUNCTIONAL ASSESSMENT
PAIN_FUNCTIONAL_ASSESSMENT: 0-10

## 2024-07-24 ASSESSMENT — PAIN SCALES - GENERAL
PAINLEVEL_OUTOF10: 0 - NO PAIN
PAINLEVEL_OUTOF10: 8
PAINLEVEL_OUTOF10: 0 - NO PAIN
PAINLEVEL_OUTOF10: 0 - NO PAIN

## 2024-07-24 ASSESSMENT — ENCOUNTER SYMPTOMS
LOSS OF SENSATION IN FEET: 0
OCCASIONAL FEELINGS OF UNSTEADINESS: 0
DEPRESSION: 0

## 2024-07-24 NOTE — H&P
"HISTORY AND PHYSICAL    History Of Present Illness  Zuleima Recinos is a 68 y.o. female presenting with chronic pain.  Here for Cervical interlaminar epidural steroid injection    she denies any recent antibiotic use or infections, she denies any blood thinner use , and she denies contrast or local anesthetic allergies     Past Medical History  Past Medical History:   Diagnosis Date    Functional dyspepsia 05/22/2018    Nonulcer dyspepsia    Personal history of other diseases of the circulatory system 12/16/2017    History of hypertension    Personal history of other diseases of the digestive system 09/10/2019    History of esophageal reflux    Personal history of other diseases of the musculoskeletal system and connective tissue 12/16/2017    History of fibromyositis       Surgical History  Past Surgical History:   Procedure Laterality Date    OTHER SURGICAL HISTORY  02/01/2021    Hysterectomy        Social History  She reports that she has never smoked. She has never used smokeless tobacco. She reports that she does not currently use alcohol. She reports that she does not use drugs.    Family History  No family history on file.     Allergies  Cefuroxime, Evolocumab, and Latex    Review of Systems   12 point ROS done and negative except for the above.   Physical Exam     General: NAD, well groomed, well nourished  Eyes: Non-icteric sclera, EOMI  Ears, Nose, Mouth, and Throat: External ears and nose appear to be without deformity or rash. No lesions or masses noted. Hearing is grossly intact.   Neck: Trachea midline  Respiratory: Nonlabored breathing   Cardiovascular: No peripheral edema   Skin: No rashes or open lesions/ulcers identified on skin.    Last Recorded Vitals  Blood pressure 156/73, pulse 82, temperature 36 °C (96.8 °F), temperature source Temporal, resp. rate 16, height 1.549 m (5' 1\"), weight 84.1 kg (185 lb 6.5 oz), SpO2 97%.    Relevant Results           Assessment/Plan       Risks, benefits, " alternatives discussed. All questions answered to the best of my ability. Patient agrees to proceed.   -We will proceed with planned procedure        Alvarado Spaulding MD  Chronic Pain Fellow  [facility]

## 2024-07-24 NOTE — BRIEF OP NOTE
Date: 2024  OR Location: [facility] NON-OR PROCEDURES    Name: Zuleima Recinos, : 1955, Age: 68 y.o., MRN: [id number], Sex: female    Diagnosis  Cervical radiculopathy * No Diagnosis Codes entered *     Procedures  GAY C7/T1    Surgeons   Olena    Resident/Fellow/Other Assistant:      Procedure Summary  Anesthesia: Anesthesia type not filed in the log.  ASA: ASA status not filed in the log.  Anesthesia Staff: No anesthesia staff entered.  Estimated Blood Loss: 0mL  Intra-op Medications: * Intraprocedure medication information is unavailable because the case start and end events have not been set *      Intraprocedure I/O Totals       None           Specimen: No specimens collected     Staff:   Circulator: Azul Zaidi Person: Leonila          Findings:   Zuleima Recinos is a 68 y.o.  female  with cervical radiculitis here for cervical epidural steroid injection.    Procedure performed: Cervical interlaminar epidural steroid injection at C7/T1 under fluoroscopic guidance     Performed by: Dr. Hyatt  Assistant: Alvarado Spaulding MD     The patient was identified in the preoperative holding area and marked according to protocol.  Informed consent was obtained and he was brought to the operating room on a gurney. A timeout was performed and consent was verified.  ASA Standard monitors were applied.  Patient was positioned prone on the operating room table and x-ray was used to identify the target area.  Skin was prepped in the usual fashion with ChloraPrep and draped using sterile towels.  The skin was anesthetized with 0.5% lidocaine with bicarbonate using a 27-gauge hypodermic needle.  A 20-gauge Touhy was used to contact the lamina and walked into the epidural space.  Epidural access was confirmed using contrast and confirmed on AP and lateral views. There was no evidence of intravascular or intrathecal contrast spread.  4 mL of 0.5% lidocaine and 10 mg of dexamethasone were injected in the epidural  space.  The  needle was removed.  Hemostasis was ensured.  A bandage was applied.  The patient was brought to the recovery area in stable condition and there were no apparent complications.    All injected medications used were confirmed to be preservative-free and not .    Anesthesia: Local lidocaine 0.5% and  sedation with midazolam          Complications:  None; patient tolerated the procedure well.     Disposition: PACU - hemodynamically stable.  Condition: stable  Specimens Collected: No specimens collected  Attending Attestation: I performed the procedure.    MD Olena

## 2024-07-25 ENCOUNTER — TELEPHONE (OUTPATIENT)
Dept: PAIN MEDICINE | Facility: CLINIC | Age: 69
End: 2024-07-25

## 2024-07-25 ENCOUNTER — APPOINTMENT (OUTPATIENT)
Dept: PRIMARY CARE | Facility: CLINIC | Age: 69
End: 2024-07-25
Payer: MEDICARE

## 2024-07-25 NOTE — TELEPHONE ENCOUNTER
Patient called concerned that her injection that she had done yesterday can take up to 2 weeks to take full affects. Pt was inquiring if we can prescribe medication to help with her pain in the mean time. I call left a message on voicemail that we don't prescribe medication at this time we would like to see if injection will work first. Patient can take OTC medication as directed for pain at this time. She should call me back in two weeks to give update on her pain and how affective injection has been .

## 2024-07-29 DIAGNOSIS — I10 ESSENTIAL HYPERTENSION: Primary | ICD-10-CM

## 2024-07-29 DIAGNOSIS — R73.03 PREDIABETES: ICD-10-CM

## 2024-07-31 NOTE — PROGRESS NOTES
Subjective   Patient ID: Zuleima Recinos is a 68 y.o. female who presents for blood pressure check    HPI  The patient reports continued chronic dyspnea times years walking up stairs-unchanged over the past month.  Over the past month, the patient reports no chest pain,  headache, presyncope  Over the past month, the patient reports continued intermittent episodes of acid in the chest and throat.  She reports no associated symptoms.  Over the past month she reports a decrease in the frequency of episodes.    Over the past month, she reports experiencing only 1 episode of cramping pain over the entire abdomen.    Since receiving an epidural on July 24, she reports no change in the frequency or intensity of chronic constant pain in both upper arm regions and over the neck.  She reports no change in bilateral upper and lower extremity weakness as well..  She will be following up with pain management later this week..  She does have an appointment scheduled with the [facility] August 23  No other new complaints.  The patient reports experiencing nausea and increasing fatigue with use of topiramate so she discontinued the medication over 2 weeks ago.  Review of Systems    Objective   There were no vitals taken for this visit.    Physical Exam  Lungs-clear.  Cardiac-rate normal, rhythm regular, no murmurs, no JVD.  Abdomen-soft, nondistended. Normal active bowel sounds. Palpation revealed no tenderness or masses..  Extremities-no peripheral edema  Assessment/Plan   Problem List Items Addressed This Visit             ICD-10-CM    Functional dyspepsia K30    Essential hypertension - Primary I10    Relevant Orders    Basic Metabolic Panel    Fibromyalgia M79.7    Hypokalemia E87.6    Relevant Orders    Basic Metabolic Panel        Assessment  Hypertension-blood pressure at goal  Continued intermittent episodes of acid in the chest and throat-probably secondary to gastroesophageal reflux  Gastroesophageal  reflux  Nonalcoholic fatty liver disease  Continued intermittent episodes of cramping pain over the entire abdomen-May be secondary to IBS  IBS  Hypercholesterolemia  Continued generalized fatigue and weakness-unsure of etiology.  May be secondary to insomnia  Bilateral neuroforaminal stenosis cervical spine at multiple levels  Chronic insomnia-May be secondary to primary insomnia    Plan  Obtain BMP today  Refer back to pain management for further evaluation and treatment  Increase melatonin dosage from 5 mg at bedtime to 10 mg p.o. at bedtime  Continue all other current medications for now.  Patient should return for follow-up visit in 3 to 4 months

## 2024-08-05 ENCOUNTER — APPOINTMENT (OUTPATIENT)
Dept: PRIMARY CARE | Facility: CLINIC | Age: 69
End: 2024-08-05
Payer: COMMERCIAL

## 2024-08-05 ENCOUNTER — LAB (OUTPATIENT)
Dept: LAB | Facility: LAB | Age: 69
End: 2024-08-05
Payer: MEDICARE

## 2024-08-05 VITALS
HEART RATE: 90 BPM | SYSTOLIC BLOOD PRESSURE: 124 MMHG | WEIGHT: 185 LBS | BODY MASS INDEX: 34.96 KG/M2 | DIASTOLIC BLOOD PRESSURE: 72 MMHG | TEMPERATURE: 97.6 F

## 2024-08-05 DIAGNOSIS — E87.6 HYPOKALEMIA: ICD-10-CM

## 2024-08-05 DIAGNOSIS — I10 ESSENTIAL HYPERTENSION: ICD-10-CM

## 2024-08-05 DIAGNOSIS — M79.7 FIBROMYALGIA: ICD-10-CM

## 2024-08-05 DIAGNOSIS — I10 ESSENTIAL HYPERTENSION: Primary | ICD-10-CM

## 2024-08-05 DIAGNOSIS — K30 FUNCTIONAL DYSPEPSIA: ICD-10-CM

## 2024-08-05 LAB
ANION GAP SERPL CALC-SCNC: 19 MMOL/L (ref 10–20)
BUN SERPL-MCNC: 20 MG/DL (ref 6–23)
CALCIUM SERPL-MCNC: 9.8 MG/DL (ref 8.6–10.6)
CHLORIDE SERPL-SCNC: 99 MMOL/L (ref 98–107)
CO2 SERPL-SCNC: 23 MMOL/L (ref 21–32)
CREAT SERPL-MCNC: 1.04 MG/DL (ref 0.5–1.05)
EGFRCR SERPLBLD CKD-EPI 2021: 58 ML/MIN/1.73M*2
GLUCOSE SERPL-MCNC: 126 MG/DL (ref 74–99)
POTASSIUM SERPL-SCNC: 4 MMOL/L (ref 3.5–5.3)
SODIUM SERPL-SCNC: 137 MMOL/L (ref 136–145)

## 2024-08-05 PROCEDURE — 3074F SYST BP LT 130 MM HG: CPT | Performed by: INTERNAL MEDICINE

## 2024-08-05 PROCEDURE — 1159F MED LIST DOCD IN RCRD: CPT | Performed by: INTERNAL MEDICINE

## 2024-08-05 PROCEDURE — 99213 OFFICE O/P EST LOW 20 MIN: CPT | Performed by: INTERNAL MEDICINE

## 2024-08-05 PROCEDURE — 36415 COLL VENOUS BLD VENIPUNCTURE: CPT

## 2024-08-05 PROCEDURE — 1160F RVW MEDS BY RX/DR IN RCRD: CPT | Performed by: INTERNAL MEDICINE

## 2024-08-05 PROCEDURE — 80048 BASIC METABOLIC PNL TOTAL CA: CPT

## 2024-08-05 PROCEDURE — 3078F DIAST BP <80 MM HG: CPT | Performed by: INTERNAL MEDICINE

## 2024-08-05 RX ORDER — MINERAL OIL
180 ENEMA (ML) RECTAL DAILY
COMMUNITY

## 2024-08-20 ENCOUNTER — LAB (OUTPATIENT)
Dept: LAB | Facility: LAB | Age: 69
End: 2024-08-20
Payer: COMMERCIAL

## 2024-08-20 DIAGNOSIS — R73.03 PREDIABETES: ICD-10-CM

## 2024-08-20 DIAGNOSIS — I10 ESSENTIAL HYPERTENSION: ICD-10-CM

## 2024-08-20 LAB
ALBUMIN SERPL BCP-MCNC: 5 G/DL (ref 3.4–5)
ALP SERPL-CCNC: 66 U/L (ref 33–136)
ALT SERPL W P-5'-P-CCNC: 26 U/L (ref 7–45)
ANION GAP SERPL CALC-SCNC: 18 MMOL/L (ref 10–20)
AST SERPL W P-5'-P-CCNC: 28 U/L (ref 9–39)
BASOPHILS # BLD AUTO: 0.03 X10*3/UL (ref 0–0.1)
BASOPHILS NFR BLD AUTO: 0.5 %
BILIRUB SERPL-MCNC: 0.6 MG/DL (ref 0–1.2)
BUN SERPL-MCNC: 24 MG/DL (ref 6–23)
CALCIUM SERPL-MCNC: 9.9 MG/DL (ref 8.6–10.6)
CHLORIDE SERPL-SCNC: 98 MMOL/L (ref 98–107)
CHOLEST SERPL-MCNC: 392 MG/DL (ref 0–199)
CHOLESTEROL/HDL RATIO: 10.1
CO2 SERPL-SCNC: 24 MMOL/L (ref 21–32)
CREAT SERPL-MCNC: 1.19 MG/DL (ref 0.5–1.05)
CREAT UR-MCNC: 112.9 MG/DL (ref 20–320)
EGFRCR SERPLBLD CKD-EPI 2021: 49 ML/MIN/1.73M*2
EOSINOPHIL # BLD AUTO: 0.08 X10*3/UL (ref 0–0.4)
EOSINOPHIL NFR BLD AUTO: 1.3 %
ERYTHROCYTE [DISTWIDTH] IN BLOOD BY AUTOMATED COUNT: 13.2 % (ref 11.5–14.5)
EST. AVERAGE GLUCOSE BLD GHB EST-MCNC: 126 MG/DL
GLUCOSE SERPL-MCNC: 115 MG/DL (ref 74–99)
HBA1C MFR BLD: 6 %
HCT VFR BLD AUTO: 36.5 % (ref 36–46)
HDLC SERPL-MCNC: 38.7 MG/DL
HGB BLD-MCNC: 12.6 G/DL (ref 12–16)
IMM GRANULOCYTES # BLD AUTO: 0.03 X10*3/UL (ref 0–0.5)
IMM GRANULOCYTES NFR BLD AUTO: 0.5 % (ref 0–0.9)
LDLC SERPL CALC-MCNC: ABNORMAL MG/DL
LYMPHOCYTES # BLD AUTO: 2.58 X10*3/UL (ref 0.8–3)
LYMPHOCYTES NFR BLD AUTO: 43.4 %
MCH RBC QN AUTO: 32.4 PG (ref 26–34)
MCHC RBC AUTO-ENTMCNC: 34.5 G/DL (ref 32–36)
MCV RBC AUTO: 94 FL (ref 80–100)
MICROALBUMIN UR-MCNC: 9.6 MG/L
MICROALBUMIN/CREAT UR: 8.5 UG/MG CREAT
MONOCYTES # BLD AUTO: 0.5 X10*3/UL (ref 0.05–0.8)
MONOCYTES NFR BLD AUTO: 8.4 %
NEUTROPHILS # BLD AUTO: 2.73 X10*3/UL (ref 1.6–5.5)
NEUTROPHILS NFR BLD AUTO: 45.9 %
NON HDL CHOLESTEROL: 353 MG/DL (ref 0–149)
NRBC BLD-RTO: 0 /100 WBCS (ref 0–0)
PLATELET # BLD AUTO: 250 X10*3/UL (ref 150–450)
POTASSIUM SERPL-SCNC: 3.9 MMOL/L (ref 3.5–5.3)
PROT SERPL-MCNC: 7.9 G/DL (ref 6.4–8.2)
RBC # BLD AUTO: 3.89 X10*6/UL (ref 4–5.2)
SODIUM SERPL-SCNC: 136 MMOL/L (ref 136–145)
TRIGL SERPL-MCNC: 493 MG/DL (ref 0–149)
VLDL: ABNORMAL
WBC # BLD AUTO: 6 X10*3/UL (ref 4.4–11.3)

## 2024-08-20 PROCEDURE — 85025 COMPLETE CBC W/AUTO DIFF WBC: CPT

## 2024-08-20 PROCEDURE — 80061 LIPID PANEL: CPT

## 2024-08-20 PROCEDURE — 83036 HEMOGLOBIN GLYCOSYLATED A1C: CPT

## 2024-08-20 PROCEDURE — 82570 ASSAY OF URINE CREATININE: CPT

## 2024-08-20 PROCEDURE — 80053 COMPREHEN METABOLIC PANEL: CPT

## 2024-08-20 PROCEDURE — 82043 UR ALBUMIN QUANTITATIVE: CPT

## 2024-08-23 ENCOUNTER — TELEPHONE (OUTPATIENT)
Dept: CARDIOLOGY | Facility: HOSPITAL | Age: 69
End: 2024-08-23
Payer: COMMERCIAL

## 2024-08-23 ENCOUNTER — APPOINTMENT (OUTPATIENT)
Dept: CARDIOLOGY | Facility: CLINIC | Age: 69
End: 2024-08-23
Payer: MEDICARE

## 2024-08-23 NOTE — TELEPHONE ENCOUNTER
Messaged pt that Dr. Hartley was sick and had to cancel clinic. Pt acknowledged text. We will contact her soon to reschedule.   Sara France RD, [facility]

## 2024-08-29 ENCOUNTER — APPOINTMENT (OUTPATIENT)
Dept: PAIN MEDICINE | Facility: CLINIC | Age: 69
End: 2024-08-29
Payer: COMMERCIAL

## 2024-08-29 DIAGNOSIS — M54.2 CERVICAL PAIN (NECK): ICD-10-CM

## 2024-08-29 PROCEDURE — 99213 OFFICE O/P EST LOW 20 MIN: CPT | Performed by: PAIN MEDICINE

## 2024-08-29 RX ORDER — GABAPENTIN 100 MG/1
100 CAPSULE ORAL 3 TIMES DAILY
Qty: 90 CAPSULE | Refills: 2 | Status: SHIPPED | OUTPATIENT
Start: 2024-08-29 | End: 2024-11-27

## 2024-08-29 NOTE — PROGRESS NOTES
8/29/2024    Virtual interview     Ms. Recinos had virtual interview today. She reported the cervical epidural injection did not help much. She is still has arm pain . She has not seen spine surgeon before.      Plan  Refer to to spine surgeon for consultation.   Gabapentin 100 mg tid  titration  Consider cervical facet block   Continue with home exercise program      MD Zaire Herman MD  Anesthesiologist  Pain Management     Progress Notes     Signed     Encounter Date: 6/20/2024     Signed       Expand All Collapse All       Domingo  Patient ID: Zuleima Recinos is a 68 y.o. female who presents for Pain (Pt here today c/o pain in bilateral shoulder and somewhat in neck. No recent PT, recent images. Currently take OTC Tylenol ).     Pain           Ms. Recinos is pleasant 68 y  lady came in today as new patient today. She has history of fibromyalgia came with chief complain of neck , bilateral arm pain and tinglings of both hands mainly medial two fingers. Pain started 6 month ago and has progressed. No inciting event. Pain sort of constant, gets up and down over the day.she no weakness not urine incontinence . Takes tylenol with no much help. She is doing her home stretch . Her new MRI shows multiple foraminal stenosis.         Review of Systems   All other systems reviewed and are negative.          Current Medications      Current Outpatient Medications:     albuterol (ProAir HFA) 90 mcg/actuation inhaler, Inhale 2 puffs every 6 hours if needed., Disp: , Rfl:     celecoxib (CeleBREX) 100 mg capsule, Take 1 capsule (100 mg) by mouth 2 times a day., Disp: 60 capsule, Rfl: 1    famotidine (Pepcid) 20 mg tablet, Take 1 tablet (20 mg) by mouth 2 times a day., Disp: , Rfl:     fexofenadine (Allegra) 60 mg tablet, Take 1 tablet (60 mg) by mouth 2 times a day., Disp: , Rfl:     fluticasone (Flonase) 50 mcg/actuation nasal spray, Administer 2 sprays into affected  nostril(s) once daily., Disp: , Rfl:     hydroCHLOROthiazide (HYDRODiuril) 50 mg tablet, Take 1 tablet (50 mg) by mouth once daily. Take 1 tablet daily, Disp: 90 tablet, Rfl: 3    potassium chloride CR 20 mEq ER tablet, TAKE 1 TABLET(20 MEQ) BY MOUTH TWICE DAILY, Disp: 60 tablet, Rfl: 6         Medical History        Past Medical History:   Diagnosis Date    Functional dyspepsia 05/22/2018     Nonulcer dyspepsia    Personal history of other diseases of the circulatory system 12/16/2017     History of hypertension    Personal history of other diseases of the digestive system 09/10/2019     History of esophageal reflux    Personal history of other diseases of the musculoskeletal system and connective tissue 12/16/2017     History of fibromyositis            Surgical History         Past Surgical History:   Procedure Laterality Date    OTHER SURGICAL HISTORY   02/01/2021     Hysterectomy            Family History   No family history on file.         RX Allergies        Allergies   Allergen Reactions    Cefuroxime Unknown    Evolocumab Rash                  Objective      Vitals:     06/20/24 0958   BP: 152/86   Pulse: 89   Resp: 17         === 05/31/24 ===     MR CERVICAL SPINE WO CONTRAST     - Impression -  There is multilevel spondylosis with varying degrees of spinal canal  and neural foraminal narrowing as described above. The most  pronounced spinal canal narrowing is noted at the C4/5 level.     MACRO:  None.     Signed by: Julien Hutchins 5/31/2024 12:57 PM  Dictation workstation:   [id number]      Physical Exam     GENERAL EXAM  Vital Signs: Vital signs to include heart rate, respiration rate, blood pressure, and temperature were reviewed.  General Appearance:  Awake, alert, healthy appearing, well developed, No acute distress.  Head: Normocephalic without evidence of head injury.  Neck: The appearance of the neck was normal without swelling with a midline trachea.  Eyes: The eyelids and eyebrows exhibited no  "abnormalities.  Pupils were not pin-point.  Sclera was without icterus.  Lungs: Respiration rhythm and depth was normal.  Respiratory movements were normal without labored breathing.  Cardiovascular: No peripheral edema was present.    Neurological: Patient was oriented to time, place, and person.  Speech was normal.  Balance, gait, and stance were unremarkable.    Tenderness over the cervical facet joints area louisa  Tenderness of  subacromial bursa worse on the rt comparing to the left  Psychiatric: Appearance was normal with appropriate dress.  Mood was euthymic and affect was normal.  Skin: Affected regions were without ecchymosis or skin lesions.           Physical exam as above except:                 Assessment/Plan  Cervical spondylosis  Cervical radiculopathy     Plan  GAY  Topamax titration  aquatherapy                  Electronically signed by Zaire Hyatt MD at 6/20/2024 10:57 AM         Office Visit on 6/20/2024          Note viewed by patient  Additional Documentation    Vitals: /86     Pulse 89     Resp 17     Ht 1.562 m (5' 1.5\")     Wt 81.6 kg (180 lb)     BMI 33.46 kg/m²     BSA 1.88 m²     Pain Sc   8 (Loc: Shoulder)          More Vitals   Flowsheets: Interfaced Flowsheet Data,     RADAR AP SCORING,     Pain Assessment,     Pain Assessment,     Pain Disability Index   Encounter Info: Billing Info,     History,     Allergies     Orders Placed      FL pain management    Epidural Steroid Injection    Referral to Physical Therapy Pending Review  All Encounter Results  Other Orders Performed    Referral to Pain Medicine Authorized  Medication Changes      topiramate 25 mg Take 1 tablet (25 mg) by mouth 2 times a day for 14 days, THEN 2 tablets (50 mg) 2 times a day for 14 days, THEN 3 tablets (75 mg) 2 times a day for 14 days, THEN 4 tablets (100 mg) 2 times a day for 14 days.  Medication List  Visit Diagnoses      Cervical spinal stenosis    Chronic bilateral low back pain with sciatica, " sciatica laterality unspecified  Problem List

## 2024-09-18 ENCOUNTER — TELEPHONE (OUTPATIENT)
Dept: PRIMARY CARE | Facility: CLINIC | Age: 69
End: 2024-09-18

## 2024-09-18 ENCOUNTER — HOSPITAL ENCOUNTER (OUTPATIENT)
Dept: RADIOLOGY | Facility: CLINIC | Age: 69
Discharge: HOME | End: 2024-09-18
Payer: MEDICARE

## 2024-09-18 ENCOUNTER — OFFICE VISIT (OUTPATIENT)
Dept: ORTHOPEDIC SURGERY | Facility: CLINIC | Age: 69
End: 2024-09-18
Payer: MEDICARE

## 2024-09-18 DIAGNOSIS — M54.2 CERVICAL PAIN: ICD-10-CM

## 2024-09-18 DIAGNOSIS — M54.2 CERVICAL PAIN (NECK): ICD-10-CM

## 2024-09-18 DIAGNOSIS — M54.12 CERVICAL RADICULOPATHY: Primary | ICD-10-CM

## 2024-09-18 PROCEDURE — 99204 OFFICE O/P NEW MOD 45 MIN: CPT | Performed by: ORTHOPAEDIC SURGERY

## 2024-09-18 PROCEDURE — 72050 X-RAY EXAM NECK SPINE 4/5VWS: CPT

## 2024-09-18 PROCEDURE — 1036F TOBACCO NON-USER: CPT | Performed by: ORTHOPAEDIC SURGERY

## 2024-09-18 PROCEDURE — 1159F MED LIST DOCD IN RCRD: CPT | Performed by: ORTHOPAEDIC SURGERY

## 2024-09-18 PROCEDURE — 99214 OFFICE O/P EST MOD 30 MIN: CPT | Performed by: ORTHOPAEDIC SURGERY

## 2024-09-18 PROCEDURE — 72050 X-RAY EXAM NECK SPINE 4/5VWS: CPT | Performed by: RADIOLOGY

## 2024-09-18 NOTE — PROGRESS NOTES
Chief Complaint: Cervical radiculopathy and neck pain    HPI: Zuleima Recinos is a 69 y.o. year old female patient with PMH significant for HTN, HLD, fibromyalgia who presents with 1 year history of neck and bilateral arm pain.  States that the pain started about a year ago atraumatic.  She initially tried some NSAIDs without any significant relief she kind of ignored it up until recently when she feels the pain had worsened.  About 6 months ago she felt that the pain was now in both arms.  She was referred to pain management and MRI was done.  She underwent a cervical epidural steroid injection on July 24, 2024 with Dr. Hyatt.  She did not notice any significant difference.  On the left side she complains of some numbness along the ulnar aspect of her left hand.  Otherwise no numbness on the right side.  Denies any myelopathic symptoms.  No difficulty with the use of her hands or walking or ambulation.  She was started on gabapentin at a low dose and has been taking it for 2 weeks.  Denies any history of cervical spine surgery she has not done any formal physical therapy.      No anticoagulations  No tobacco use    Review of Systems    All other systems have been reviewed and are negative for complaint. All pertinent positive and negative as listed in history of present illness.    Past Medical History:   Diagnosis Date    Functional dyspepsia 05/22/2018    Nonulcer dyspepsia    Personal history of other diseases of the circulatory system 12/16/2017    History of hypertension    Personal history of other diseases of the digestive system 09/10/2019    History of esophageal reflux    Personal history of other diseases of the musculoskeletal system and connective tissue 12/16/2017    History of fibromyositis        Past Surgical History:   Procedure Laterality Date    OTHER SURGICAL HISTORY  02/01/2021    Hysterectomy        Allergies   Allergen Reactions    Cefuroxime Unknown    Evolocumab Rash    Latex Rash         Current Outpatient Medications on File Prior to Visit   Medication Sig Dispense Refill    acetaminophen (Tylenol) 500 mg tablet Take 2 tablets (1,000 mg) by mouth every 6 hours if needed for mild pain (1 - 3).      albuterol (ProAir HFA) 90 mcg/actuation inhaler Inhale 2 puffs every 6 hours if needed.      amLODIPine-benazepriL (Lotrel) 2.5-10 mg capsule Take 1 capsule by mouth once daily. 100 capsule 3    cholecalciferol (Vitamin D-3) 50 MCG (2000 UT) tablet Take 1 tablet (50 mcg) by mouth once daily.      famotidine (Pepcid) 40 mg tablet TAKE 1 TABLET(40 MG) BY MOUTH TWICE DAILY 180 tablet 2    fexofenadine (Allegra) 180 mg tablet Take 1 tablet (180 mg) by mouth once daily.      fluticasone (Flonase) 50 mcg/actuation nasal spray Administer 2 sprays into affected nostril(s) once daily.      gabapentin (Neurontin) 100 mg capsule Take 1 capsule (100 mg) by mouth 3 times a day. 90 capsule 2    hydroCHLOROthiazide (HYDRODiuril) 50 mg tablet Take 1 tablet (50 mg) by mouth once daily. Take 1 tablet daily 90 tablet 3    melatonin 5 mg capsule Take by mouth.      potassium chloride CR 20 mEq ER tablet TAKE 1 TABLET(20 MEQ) BY MOUTH TWICE DAILY 60 tablet 6     No current facility-administered medications on file prior to visit.            PE:   General: Patient appears well-nourished and well-developed in no acute distress, Alert and Oriented x3  Psych: Pleasant mood and affect  HEENT: Extraocular muscles intact, pupils equal and round. Sclerae anicteric   Cardio: extremities warm and well perfused  Resp: unlabored symmetric breathing  Skin: no open wounds or rash  Musculoskeletal/Neuro Exam: Normal gait.  Diffuse tenderness to palpation along the paraspinal and trapezius bilaterally.  Mild pain with cervical range of motion.  Positive Spurlings on the right negative on the left.  Negative Lhermitte's Sign    Upper extremity:    Motor: Right upper extremity was 5 out of 5 strength with shoulder abduction, elbow flexion  and extension, wrist flexion and extension and  against resistance  Left upper extremity with 5 out of 5 strength with shoulder abduction, elbow flexion and extension, wrist flexion and extension and  against resistance    Sensation to light touch intact along C5-T1 distribution bilaterally except for some diminished sensation along the ulnar aspect of the left hand    Reflex: 2+ brachioradialis and biceps bilaterally    Upper Motor Signs: Negative Reginaldo sign bilaterally    Lower extremity    Motor: Right leg with 5 out of 5 motor strength with hip flexion, knee extension, ankle dorsiflexion plantarflexion EHL against resistance  Left leg with 5 out of 5 motor strength with hip flexion, knee extension, ankle dorsiflexion plantarflexion EHL against resistance    Sensation to light touch intact along L2-S1 distribution bilaterally    2+ patella and Achilles Reflex          Imaging:    I personally reviewed xray of the cervical spine obtained in clinic today. AP, Lateral, flexion and extension xrays were reviewed. No evidence of acute fracture or dislocation.  She has multilevel spondylotic changes from C3-C7 with disc space narrowing and facet arthrosis.  No dynamic instability with flexion extension      I reviewed the MRI of the cervical spine from May 31st 2024 which shows multilevel spondylotic changes.  There is posterior disc osteophyte complex with hypertrophic ligamentum flavum at C3-4, C4-5 and C5-6.  There is bilateral foraminal narrowing at multiple levels as well as moderate central stenosis at C4-5 and mild central canal stenosis at C5-6      A/P: Zuleima Recinos is a 69 y.o. year old female patient with chronic neck pain and bilateral cervical radiculopathy pain right worse than left numbness left worse than right.  Otherwise neurologically intact.  No myelopathic symptoms.  MRI shows multilevel spondylotic changes with stenosis worst at C4-5 and mild at C5-6 secondary to degenerative  changes.  I reviewed the images with her in clinic today.  She has undergone 1 epidural steroid injection which did not provide any relief.  She has not had much conservative treatment.  I discussed trying physical therapy as well as titrating up on her gabapentin which she can discuss with Dr. Hyatt.  Alternatively surgery would involved anterior cervical discectomy and fusion probably at C4-5 C5-6.  At this point she feels like she is not ready for any type of surgical intervention.  She was amendable to continue with conservative treatments.  She was given a new referral for physical therapy.  She can follow-up if she is not seeing any relief and wants to discuss surgery. After our discussion, the patient articulated understanding of the plan and felt that all questions had been answered satisfactorily. The patient was pleased with the visit and very appreciative for the care rendered.    **Please excuse any errors in grammar or translation related to this dictation. Voice recognition software was utilized to prepare this document. **                    Renetta Garcia MD    Department of Orthopaedic Surgery  [facility]  [email or web address]

## 2024-09-23 DIAGNOSIS — E78.00 PURE HYPERCHOLESTEROLEMIA: ICD-10-CM

## 2024-09-23 DIAGNOSIS — R73.03 PREDIABETES: ICD-10-CM

## 2024-09-26 ENCOUNTER — APPOINTMENT (OUTPATIENT)
Dept: CARDIOLOGY | Facility: CLINIC | Age: 69
End: 2024-09-26
Payer: COMMERCIAL

## 2024-10-01 PROBLEM — Z96.659 S/P TKR (TOTAL KNEE REPLACEMENT): Status: ACTIVE | Noted: 2021-03-16

## 2024-10-01 PROBLEM — R06.09 DOE (DYSPNEA ON EXERTION): Status: ACTIVE | Noted: 2024-10-01

## 2024-10-01 PROBLEM — E66.9 OBESITY: Status: ACTIVE | Noted: 2022-03-04

## 2024-10-01 PROBLEM — E66.811 CLASS 1 OBESITY WITH BODY MASS INDEX (BMI) OF 34.0 TO 34.9 IN ADULT: Status: ACTIVE | Noted: 2022-03-04

## 2024-10-01 PROBLEM — M25.569 ACUTE KNEE PAIN: Status: ACTIVE | Noted: 2024-10-01

## 2024-10-03 ENCOUNTER — OFFICE VISIT (OUTPATIENT)
Dept: CARDIOLOGY | Facility: CLINIC | Age: 69
End: 2024-10-03
Payer: MEDICARE

## 2024-10-03 VITALS — BODY MASS INDEX: 34.01 KG/M2 | DIASTOLIC BLOOD PRESSURE: 70 MMHG | SYSTOLIC BLOOD PRESSURE: 142 MMHG | WEIGHT: 180 LBS

## 2024-10-03 DIAGNOSIS — E55.9 VITAMIN D DEFICIENCY: ICD-10-CM

## 2024-10-03 DIAGNOSIS — E87.6 HYPOKALEMIA: ICD-10-CM

## 2024-10-03 DIAGNOSIS — K58.2 IRRITABLE BOWEL SYNDROME WITH BOTH CONSTIPATION AND DIARRHEA: ICD-10-CM

## 2024-10-03 DIAGNOSIS — R53.82 CHRONIC FATIGUE: ICD-10-CM

## 2024-10-03 DIAGNOSIS — R73.03 PREDIABETES: ICD-10-CM

## 2024-10-03 DIAGNOSIS — E78.00 PURE HYPERCHOLESTEROLEMIA: ICD-10-CM

## 2024-10-03 DIAGNOSIS — I10 ESSENTIAL HYPERTENSION: ICD-10-CM

## 2024-10-03 PROCEDURE — 1036F TOBACCO NON-USER: CPT | Performed by: STUDENT IN AN ORGANIZED HEALTH CARE EDUCATION/TRAINING PROGRAM

## 2024-10-03 PROCEDURE — 3077F SYST BP >= 140 MM HG: CPT | Performed by: STUDENT IN AN ORGANIZED HEALTH CARE EDUCATION/TRAINING PROGRAM

## 2024-10-03 PROCEDURE — 1126F AMNT PAIN NOTED NONE PRSNT: CPT | Performed by: STUDENT IN AN ORGANIZED HEALTH CARE EDUCATION/TRAINING PROGRAM

## 2024-10-03 PROCEDURE — 97803 MED NUTRITION INDIV SUBSEQ: CPT | Performed by: DIETITIAN, REGISTERED

## 2024-10-03 PROCEDURE — RXMED WILLOW AMBULATORY MEDICATION CHARGE

## 2024-10-03 PROCEDURE — 1159F MED LIST DOCD IN RCRD: CPT | Performed by: STUDENT IN AN ORGANIZED HEALTH CARE EDUCATION/TRAINING PROGRAM

## 2024-10-03 PROCEDURE — 3078F DIAST BP <80 MM HG: CPT | Performed by: STUDENT IN AN ORGANIZED HEALTH CARE EDUCATION/TRAINING PROGRAM

## 2024-10-03 PROCEDURE — 99215 OFFICE O/P EST HI 40 MIN: CPT | Performed by: STUDENT IN AN ORGANIZED HEALTH CARE EDUCATION/TRAINING PROGRAM

## 2024-10-03 PROCEDURE — 97803 MED NUTRITION INDIV SUBSEQ: CPT | Performed by: STUDENT IN AN ORGANIZED HEALTH CARE EDUCATION/TRAINING PROGRAM

## 2024-10-03 RX ORDER — EVOLOCUMAB 140 MG/ML
140 INJECTION, SOLUTION SUBCUTANEOUS
Qty: 2 ML | Refills: 0 | Status: SHIPPED | OUTPATIENT
Start: 2024-10-03 | End: 2025-10-03

## 2024-10-03 RX ORDER — AMLODIPINE AND BENAZEPRIL HYDROCHLORIDE 5; 20 MG/1; MG/1
1 CAPSULE ORAL DAILY
Qty: 30 CAPSULE | Refills: 11 | Status: SHIPPED | OUTPATIENT
Start: 2024-10-03 | End: 2025-10-03

## 2024-10-03 RX ORDER — DULAGLUTIDE 0.75 MG/.5ML
0.75 INJECTION, SOLUTION SUBCUTANEOUS WEEKLY
Qty: 2 ML | Refills: 1 | Status: SHIPPED | OUTPATIENT
Start: 2024-10-03 | End: 2025-10-03

## 2024-10-03 ASSESSMENT — PATIENT HEALTH QUESTIONNAIRE - PHQ9
1. LITTLE INTEREST OR PLEASURE IN DOING THINGS: NOT AT ALL
2. FEELING DOWN, DEPRESSED OR HOPELESS: NOT AT ALL
SUM OF ALL RESPONSES TO PHQ9 QUESTIONS 1 AND 2: 0

## 2024-10-03 ASSESSMENT — ENCOUNTER SYMPTOMS
DYSPNEA ON EXERTION: 1
ORTHOPNEA: 0
OCCASIONAL FEELINGS OF UNSTEADINESS: 0
SYNCOPE: 0
NEAR-SYNCOPE: 0
PALPITATIONS: 0
PND: 0
DEPRESSION: 0
SHORTNESS OF BREATH: 0
LOSS OF SENSATION IN FEET: 0

## 2024-10-03 ASSESSMENT — COLUMBIA-SUICIDE SEVERITY RATING SCALE - C-SSRS
6. HAVE YOU EVER DONE ANYTHING, STARTED TO DO ANYTHING, OR PREPARED TO DO ANYTHING TO END YOUR LIFE?: NO
2. HAVE YOU ACTUALLY HAD ANY THOUGHTS OF KILLING YOURSELF?: NO
1. IN THE PAST MONTH, HAVE YOU WISHED YOU WERE DEAD OR WISHED YOU COULD GO TO SLEEP AND NOT WAKE UP?: NO

## 2024-10-03 ASSESSMENT — PAIN SCALES - GENERAL: PAINLEVEL: 0-NO PAIN

## 2024-10-03 NOTE — PROGRESS NOTES
[facility] - MEDICAL NUTRITION THERAPY     69 y.o. is here today for nutrition counseling and support for hypertension, hyperlipidemia, and prediabetes . Referred by Dr Callejas    Social History: Pt reports she recently discovered that she had prediabetes, but hasn't started any medications. She reports that her brother and sister both had DM. Pt reports she has fibromyalgia but statin medications seemed to be not well tolerated due to muscle pain and she tried repatha but it caused help and was also stopped.     Lab Results   Component Value Date    HGBA1C 6.0 (H) 08/20/2024      Pt was diagnosed with pre-DM ~ 3 yeats.   Current DM meds include:  n/a    Pt uses no monitors for SMBG.    Lab Results   Component Value Date    TRIG 493 (H) 08/20/2024      Blood Pressures         10/3/2024  0833             BP: 142/70           Weight management:   Vitals:    10/03/24 0833   Weight: 81.6 kg (180 lb)    Body mass index is 34.01 kg/m².     Diet Recall:  Meal 1- skipped  Meal 2-skipped  Meal 3-one slice of chicken pizza with gluten free crust and salad with homemade italian dressing with olive oil  Beverages- 2 cans of [place] dry ginger ale, water in the evening, <1 cup of coffee with coffee mate and italian sweet cream    Typical intake: Pt reports she skips breakfast. She tries to avoid fried foods and mostly avoids gluten/flour due to her roommate being sensitive to it. She rarely snacks. She rates herself on a scale of 1 to 10, a 7 for being healthy.  Alcohol: none    Exercise: mostly sedentary; reports knee pain although she had 2 knee replacements; has under the desk bicycle     Tobacco Use: Medium Risk (10/3/2024)    Patient History     Smoking Tobacco Use: Former     Smokeless Tobacco Use: Never     Passive Exposure: Not on file    Previously smoked <1 pack day over 30 years ago    Sleep: Sleep duration/quality : 5-6 hours, disrupted sleep, and takes melatonin  Sleep disorders: none    Education/plan:  Reviewed [employer]  welcome folder.   Pt did accept meeting invites for education calls.  Pt is interested in CGM and did have Dexcom sample placed today. Pt will use phone alexander as .  Nutrition goal: Emphasized eliminating ginger ale to improve TG and blood sugar. Suggested slowly weaning down to one can or only buying mini cans. Consider naturally flavored seltzer lou. Increase water intake during the day.  Exercise goal: Wants to use cycle under her desk almost daily and will accept emails for chair exercise class.    Learning assessment/barriers: poor motivation     I will follow up with patient in 1-2 weeks for virtual nutrition follow-up.    40 minutes spent face to face.   Mariza Nix RD, LD, CDE

## 2024-10-03 NOTE — PROGRESS NOTES
[facility] - General Cardiology Note                                                                                        Reason for Visit: No chief complaint on file..  Referring Clinician: No ref. provider found     History of Present Illness  Zuleima Recinos is a 69 y.o. female with history of fibromyalgia who presents for No chief complaint on file..    List of Active Cardiac Issues:  #Prediabetes: A1c of 6%.  Patient was not aware of her diagnosis before this most recent A1c.  #Obesity: BMI 34 kg/m².  #Hypertension: Patient does not measure her blood pressure at home.  Her blood pressure today is in the 140s over 70s.  She is on amlodipine benazepril combination and hydrochlorothiazide.  Creatinine from late August is a little bit elevated with elevated BUN.  Hyperlipidemia:Most recent lipid profile most recent lipid profile was from 8/20/2024 with total cholesterol of 392 direct LDL of 262 triglycerides of 493 -patient was on Repatha in the past but stopped due to site redness and rash after 8 to 10 weeks of using the medication.  Interval History:  Patient reports that she leads a sedentary lifestyle.  She is limited by her bilateral knee pain.  She is only able to walk for 3-4 blocks on ground level.  She has stairs at home and find difficulty going up and down the stairs due to her fibromyalgia and knee pain.  She gets winded very easily.  She denies any orthopnea, PND, lower extremity swelling, syncope, or presyncope.  She denies any chest pain in the past.    Past Medical History  She has a past medical history of Functional dyspepsia (05/22/2018), Personal history of other diseases of the circulatory system (12/16/2017), Personal history of other diseases of the digestive system (09/10/2019), and Personal history of other diseases of the musculoskeletal system and connective tissue (12/16/2017).    Past Surgical History  She has a past surgical  history that includes Other surgical history (02/01/2021).    Social History  She reports that she quit smoking about 44 years ago. Her smoking use included cigarettes. She has never used smokeless tobacco. She reports that she does not currently use alcohol. She reports that she does not use drugs.    Family History  No family history on file.    Allergies  Cefuroxime, Evolocumab, and Latex    Outpatient Medications  Current Outpatient Medications   Medication Instructions    acetaminophen (TYLENOL) 1,000 mg, oral, Every 6 hours PRN    albuterol (ProAir HFA) 90 mcg/actuation inhaler 2 puffs, inhalation, Every 6 hours PRN    amLODIPine-benazepriL (Lotrel) 2.5-10 mg capsule 1 capsule, oral, Daily    cholecalciferol (VITAMIN D-3) 50 mcg, oral, Daily    famotidine (Pepcid) 40 mg tablet TAKE 1 TABLET(40 MG) BY MOUTH TWICE DAILY    fexofenadine (ALLEGRA) 180 mg, oral, Daily    fluticasone (Flonase) 50 mcg/actuation nasal spray 2 sprays, nasal, Daily    gabapentin (NEURONTIN) 100 mg, oral, 3 times daily    hydroCHLOROthiazide (HYDRODIURIL) 50 mg, oral, Daily, Take 1 tablet daily    melatonin 5 mg capsule oral    potassium chloride CR 20 mEq ER tablet TAKE 1 TABLET(20 MEQ) BY MOUTH TWICE DAILY       Review of Systems  Review of Systems   Constitutional: Negative for malaise/fatigue.   Cardiovascular:  Positive for dyspnea on exertion. Negative for leg swelling, near-syncope, orthopnea, palpitations, paroxysmal nocturnal dyspnea and syncope.   Respiratory:  Negative for shortness of breath.        Last Recorded Vitals  Vitals:    10/03/24 0833   BP: 142/70   BP Location: Left arm   Patient Position: Sitting   Weight: 81.6 kg (180 lb)       Physical Examination  General: Well appearing, well-nourished, in no acute distress.  HEENT: Normocephalic atraumatic, pupils equal and reactive to light, extraocular muscles intact, no conjunctival injection, oropharynx clear without exudates.  Neck: Normal carotid arterial pulses, no  arterial bruits, no thyromegaly.  Cardiac: Regular rhythm and normal heart rate.  S1, S2 present and normal.  No murmurs, rubs, or gallops.  PMI is nondisplaced. Jugular venous pulsations are normal.  Pulmonary: Normal breath sounds, no increased work of breathing, no wheezes or crackles.  GI: Normal bowel sounds, abdominal aorta not enlarged, no hepatosplenomegaly, no abdominal bruits.  Lower extremities: No cyanosis, clubbing, or edema.  No xanthelasma present. Normal distal pulses.  Skin: Skin intact. No significant rashes or lesions present.  Neuro: Alert and oriented x 3, normal attention and cognition, no focal motor or sensory neurologic deficits.  Psych: Normal affect and mood.  Musculoskeletal: Normal gait normal muscle tone.    Laboratory Studies  Lab Results   Component Value Date    GLUCOSE 115 (H) 08/20/2024    CALCIUM 9.9 08/20/2024     08/20/2024    K 3.9 08/20/2024    CO2 24 08/20/2024    CL 98 08/20/2024    BUN 24 (H) 08/20/2024    CREATININE 1.19 (H) 08/20/2024     Lab Results   Component Value Date    ALT 26 08/20/2024    AST 28 08/20/2024    ALKPHOS 66 08/20/2024    BILITOT 0.6 08/20/2024         Lab Results   Component Value Date    CHOL 392 (H) 08/20/2024    CHOL 387 (H) 07/05/2024    CHOL 385 (H) 11/07/2022     Lab Results   Component Value Date    HDL 38.7 08/20/2024    HDL 37.2 07/05/2024    HDL 40.0 11/07/2022     Lab Results   Component Value Date    LDLCALC  08/20/2024      Comment:      The calculation of LDL and VLDL are inaccurate when the Triglycerides are greater than 400 mg/dL or when the patient is non-fasting. If LDL measurement is necessary contact the testing laboratory for an alternative LDL assay.                                  Near   Borderline      AGE      Desirable  Optimal    High     High     Very High     0-19 Y     0 - 109     ---    110-129   >/= 130     ----    20-24 Y     0 - 119     ---    120-159   >/= 160     ----      >24 Y     0 -  99   100-129  130-159  "  160-189     >/=190      LDLCALC  07/05/2024      Comment:      The calculation of LDL and VLDL are inaccurate when the Triglycerides are greater than 400 mg/dL or when the patient is non-fasting. If LDL measurement is necessary contact the testing laboratory for an alternative LDL assay.                                  Near   Borderline      AGE      Desirable  Optimal    High     High     Very High     0-19 Y     0 - 109     ---    110-129   >/= 130     ----    20-24 Y     0 - 119     ---    120-159   >/= 160     ----      >24 Y     0 -  99   100-129  130-159   160-189     >/=190       Lab Results   Component Value Date    TRIG 493 (H) 08/20/2024    TRIG 437 (H) 07/05/2024    TRIG 546 (H) 11/07/2022     No components found for: \"CHOLHDL\"  Lab Results   Component Value Date    HGBA1C 6.0 (H) 08/20/2024     No components found for: \"UACR\"    Cardiology Tests    I personally reviewed the imaging studies and the EKG with the patient.      Assessment and Plan  Ms. Fairchild is a 69-year-old lady with history of hyperlipidemia, prediabetes, obesity, hypertension, who presents today for management of her cardiometabolic risk factors.  Overall she has poorly controlled hyperlipidemia and hypertension.  We will start her on Trulicity, Repatha, and increased her amlodipine benazepril combination    #Prediabetes: A1c of 6%.  Patient was not aware of her diagnosis before this most recent A1c.  Start Trulicity.    #Obesity: BMI 34 kg/m².  Dietary advice and start Trulicity as above.  Clinical pharmacist to titrate up medications.    #Hypertension: Patient does not measure her blood pressure at home.  Her blood pressure today is in the 140s over 70s.  She is on amlodipine benazepril combination and hydrochlorothiazide.  Creatinine from late August is a little bit elevated with elevated BUN.  -Increase amlodipine benazepril to 1020 mg daily.  Stop hydrochlorothiazide.  Repeat labs in 1 week.    Hyperlipidemia:Most recent " lipid profile most recent lipid profile was from 8/20/2024 with total cholesterol of 392 direct LDL of 262 triglycerides of 493 -patient was on Repatha in the past but stopped due to site redness and   rash after 8 to 10 weeks of using the medication.  Restart Repatha patient is open to it.  It does not seem that it was an allergic reaction.    Problem List Items Addressed This Visit    None            Melody Jang MD

## 2024-10-05 ENCOUNTER — PHARMACY VISIT (OUTPATIENT)
Dept: PHARMACY | Facility: CLINIC | Age: 69
End: 2024-10-05
Payer: MEDICARE

## 2024-10-09 ENCOUNTER — APPOINTMENT (OUTPATIENT)
Dept: CARDIOLOGY | Facility: CLINIC | Age: 69
End: 2024-10-09
Payer: MEDICARE

## 2024-10-16 ENCOUNTER — APPOINTMENT (OUTPATIENT)
Dept: CARDIOLOGY | Facility: CLINIC | Age: 69
End: 2024-10-16
Payer: MEDICARE

## 2024-11-04 ENCOUNTER — TELEPHONE (OUTPATIENT)
Dept: CARDIOLOGY | Facility: HOSPITAL | Age: 69
End: 2024-11-04
Payer: MEDICARE

## 2024-11-04 NOTE — TELEPHONE ENCOUNTER
I spoke with the patient to follow up after her visit to our [facility] clinic on 10/3/2024. At that appointment, her A1c was 6.1%, and we initiated Trulicity and Repatha for weight loss and improved glucose control, as well as increased her amlodipine-benazepril combination for better blood pressure management. The patient mentioned that, despite not experiencing any personal side effects, she is uncomfortable taking Repatha after reading about its potential side effects. Additionally, she has not yet received the Trulicity, despite a Clinical Pharmacy referral being submitted. When I asked about how she is monitoring her blood sugars, she states she is not and did not like the CGM sample she was given, she said it fell off and was alerting her too much. I will reach out to Tani for an update and inform Dr. Jang accordingly.

## 2024-11-04 NOTE — TELEPHONE ENCOUNTER
Dr. eSn would like to discuss potential changes at her next visit and see what plan the patient would be agreeable to.

## 2024-11-04 NOTE — TELEPHONE ENCOUNTER
I spoke with Tani, our Clinical Pharmacist, who informed me that the scheduling team had attempted to reach out to the patient regarding Trulicity, but she did not return their calls. She has also cancelled her dietician follow up with Neelam Nix. The patient has since called back and stated that she does not wish to take Trulicity or Repatha due to concerns about injections and potential side effects. She is now inquiring about alternative treatment options.

## 2024-11-18 ENCOUNTER — APPOINTMENT (OUTPATIENT)
Dept: PHARMACY | Facility: HOSPITAL | Age: 69
End: 2024-11-18
Payer: MEDICARE

## 2024-11-18 DIAGNOSIS — I10 ESSENTIAL HYPERTENSION: ICD-10-CM

## 2024-11-18 DIAGNOSIS — E66.811 CLASS 1 OBESITY WITH BODY MASS INDEX (BMI) OF 34.0 TO 34.9 IN ADULT, UNSPECIFIED OBESITY TYPE, UNSPECIFIED WHETHER SERIOUS COMORBIDITY PRESENT: ICD-10-CM

## 2024-11-18 DIAGNOSIS — K30 FUNCTIONAL DYSPEPSIA: ICD-10-CM

## 2024-11-18 DIAGNOSIS — R73.03 PREDIABETES: Primary | ICD-10-CM

## 2024-11-18 DIAGNOSIS — E78.00 PURE HYPERCHOLESTEROLEMIA: ICD-10-CM

## 2024-11-18 NOTE — PATIENT INSTRUCTIONS
NAME: Zuleima Recinos   DATE: 11/18/2024     Your Pharmacist Today: Emperatriz Hough, PujaD  Your Primary Care Physician: Jaylon Callejas MD   Your Referring Provider: Melody Jang MD    Thank you for your time today, Ms. Zuleima Recinos. It was a pleasure managing your health together! Below is a summary of your treatment plan, other important information, and our pharmacy team's contact numbers:  If you need to schedule or re-schedule an appointment with us, please call our scheduling line at [phone], option 1.   To schedule non-pharmacy appointments please call [phone]  If you are out of medication refills or have a medical question, please contact me at my direct line, [phone]. Please allow for up to 48 hours for a return call. You can also contact me through [email or web address].    DIAGNOSES:    Type II Diabetes Mellitus + Hyperlipidemia (high cholesterol)      TREATMENT PLAN     Medication Changes:   CONTINUE: All current medications  START  Repatha 140 mg inject under the skin every 14 days for high cholesterol   No refills   Jardiance 10 mg tablet take once daily for diabetes  #30 day supply with 3 refills - sent to [facility]    Patient Instructions/Plan  Drink more water  Continue good hygiene   Weigh yourself every day at the same time  Please complete labs at least 48 hours prior to appointment   Blood work   Urine Sample for kidney function     Follow-up Appointment: diabetes care  Follow-up with me in 4 weeks December 9th at 10 am via telephone  - [facility] Follow-Up: 1/16/24.    Education:   Repatha Monitoring and Education:  Side effects to expect with Repatha include injection site pain/swelling/redness, cold-like symptoms, headache, and rash   Monitor cholesterol levels before treatment, 4 to 12 weeks after the start of treatment, and then every 3 to 12 months thereafter.   Administration:  Repatha must be kept refrigerated, if necessary a pen may be kept at room temperature for up to  30 days.  Remove the Pen from the refrigerator. Leave the base cap on until you are ready to inject.  Check the Pen label to make sure you have the right medicine and it has not . Additionally, ensure that the solution is not cloudy, discolored, or has particles in it.  Prior to administration, wash and rinse hands.   Next, choose your injection site (You may inject into your abdomen, thigh; Another person may give you the injection in your upper arm)  Change (rotate) your injection site with each dose. You may use the same area of your body, but be sure to choose a different injection site in that area.  Once administration site has been selected, use a new alcohol swab to sanitize the administration site and allow to air dry.  Pull off the orange cap only when you are ready to inject. The orange cap can not be left off for more than 5 minutes as this can cause the medication to dry out.   Stretch or pinch your injection site to create a firm surface and place the yellow safety guard on your skin at a 90 degree angle.  Firmly push down the autoinjector onto your skin until it stops moving. When you are ready to inject press the gray start button. You will hear 2 clicks, one at the start of the injection and another once the injection is complete. Continue pushing down on your skin for about 15 seconds. The window will turn yellow when the injection is complete.   Dispose of the pen in a sharps container (Coffee can, laundry detergent bottle)  Injections will should be done on the same day every other week, if you forget you have up to 7 days to remember to do your next dose. If less than 7 days remain before the next scheduled dose, skip the missed dose and administer the next dose on the regularly scheduled day.    Empagliflozin (Jardiance) Education:  Counseled patient on Empagliflozin (Jardiance) MOA, expectations, side effects, duration of therapy, administration, and monitoring parameters.  Reviewed the  benefits of SGLT-2i therapy, such as glycemic control and kidney and CV protection.  Advised patient to practice proper  hygiene to reduce risk of UTIs or yeast infections.  Advised patient to maintain adequate fluid intake to remain hydrated while on SGLT2i therapy.  Answered all patient questions and concerns.       IMPORTANT INFORMATION     Please call 911 for medical emergencies.  Our pharmacy team is generally available from Monday-Friday, 8 am - 5 pm.  If you need to get in touch with me, you may either call me at my direct line or you can use [employer].  If you are unable to make your appointment, kindly call your our pharmacy scheduling line at [phone] at least 48 hours in advance to cancel and reschedule.  There are no supporting services by the pharmacy team on weekends and holidays, or after 5 PM on weekdays.      RETAIL PHARMACY     [facility]    [address]  [place] [address]  Phone: [phone]  Hours: M-F: 8 am - 6 pm;   Saturday: 8 am - 4 pm; Sunday 9 am - 1 pm

## 2024-11-18 NOTE — ASSESSMENT & PLAN NOTE
edication Reconciliation:   Meds: removed gabapentin and trulicity, flonase and melatonin prn  Allergies  Changed repatha and cefuroxime to intolerance  Added statin intolerance     Adherence:  Previously had a rash after 8-10 weeks of repatha, restarted after last [facility] appt and did not experience any personal side effects, but stopped taking it as she is uncomfortable using Repatha after reading about its potential side effects.     Affordability:   Patient states her insurance did not cover rescue inhaler. Ran test claim for ventolin and it showed there would be a $0 copay  Trulicity: PA denied  Test Claims:  Wegoovy - not covered  Ozempic - needs diabetes diagnosis  Metformin: $0 copay  Jardiance: $4.60 copay  Zetia: $0 copay    Diabetes Pharmacotherapy:    Has not started repatha yet  No medications  Trulicity prescribed but PA denied    Adverse Effects: none    Current monitoring regimen:   Patient is using: nothing  As of 11/4 the patient is not monitoring her blood sugars, she states she is not and did not like the CGM sample she was given, she said it fell off and was alerting her too much.    Any episodes of hypoglycemia? Denies signs and symptoms     Primary/Secondary Prevention:   - Statin? No - tried to start repatha - stopped taking    Myalgia with statin - only tried rosuvastatin?  - ACE-I/ARB? Yes  - Aspirin? No    Pertinent PMH Review:  - PMH of Pancreatitis: No  - PMH of Retinopathy: No  - PMH of Urinary Tract Infections: No  - PMH of MTC: No    Objective     Last Recorded Vitals:  BP Readings from Last 6 Encounters:   10/03/24 142/70   08/05/24 124/72   07/24/24 120/58   07/05/24 (!) 160/96   06/20/24 152/86   12/19/23 140/90       Wt Readings from Last 6 Encounters:   10/03/24 81.6 kg (180 lb)   08/05/24 83.9 kg (185 lb)   07/24/24 84.1 kg (185 lb 6.5 oz)   07/05/24 85.1 kg (187 lb 9.6 oz)   06/20/24 81.6 kg (180 lb)   05/30/24 79.8 kg (176 lb)     Labs:   Lab Results   Component Value Date     "BILITOT 0.6 08/20/2024    CALCIUM 9.9 08/20/2024    CO2 24 08/20/2024    CL 98 08/20/2024    CREATININE 1.19 (H) 08/20/2024    GLUCOSE 115 (H) 08/20/2024    ALKPHOS 66 08/20/2024    K 3.9 08/20/2024    PROT 7.9 08/20/2024     08/20/2024    AST 28 08/20/2024    ALT 26 08/20/2024    BUN 24 (H) 08/20/2024    ANIONGAP 18 08/20/2024    ALBUMIN 5.0 08/20/2024    GFRF 68 11/07/2022     Lab Results   Component Value Date    TRIG 493 (H) 08/20/2024    CHOL 392 (H) 08/20/2024    LDLCALC  08/20/2024      Comment:      The calculation of LDL and VLDL are inaccurate when the Triglycerides are greater than 400 mg/dL or when the patient is non-fasting. If LDL measurement is necessary contact the testing laboratory for an alternative LDL assay.                                  Near   Borderline      AGE      Desirable  Optimal    High     High     Very High     0-19 Y     0 - 109     ---    110-129   >/= 130     ----    20-24 Y     0 - 119     ---    120-159   >/= 160     ----      >24 Y     0 -  99   100-129  130-159   160-189     >/=190      HDL 38.7 08/20/2024     Lab Results   Component Value Date    HGBA1C 6.0 (H) 08/20/2024    HGBA1C 5.9 (H) 07/05/2024    HGBA1C 5.9 (A) 11/07/2022     The ASCVD Risk score (Jose E DK, et al., 2019) failed to calculate for the following reasons:    The valid total cholesterol range is 130 to 320 mg/dL  No components found for: \"UACR\"    Health Maintenance:   Lipid Panel: due  Urine Albumin/Creatinine Ratio: due    Assessment/Plan     DIABETES    Patients diabetes is needs improvement with most recent A1c of 6% (Goal < 6%). The goals of therapy are not \"being met\" with the current medication regimen.  Reinforced healthy diet, lifestyle, and exercise.  Prescription medication ordered.  Lab work ordered.  Medication Changes:  CONTINUE: All current medications  START  Repatha 140 mg inject under the skin every 14 days for high cholesterol   No refills   Jardiance 10 mg tablet take once daily " for diabetes  #30 day supply with 3 refills - [facility]    Rationale:  Diabetes  Glp-1s not covered by insurance. Pre-diabetes diagnosis and no CVD risk factors   Metformin provides no CVD or kidney benefits; minimal weight loss  Jardiance has CV and kidney protection; $5 copay (patient agreeable to copay), lowers sugars, and can have weight loss  Hyperlipidemia  Patient is ok to try repatha, she states the rash may not have been from the repatha and she felt it worked well  Can always switch to zetia   Compliance at present is estimated to be good. Efforts to improve compliance (if necessary) will be directed at education, patient encouraged to call if side effects are worrisome or if any questions or concerns come up.  Education Provided to Patient:    Repatha Monitoring and Education:  Side effects to expect with Repatha include injection site pain/swelling/redness, cold-like symptoms, headache, and rash   Monitor cholesterol levels before treatment, 4 to 12 weeks after the start of treatment, and then every 3 to 12 months thereafter.   Administration:  Repatha must be kept refrigerated, if necessary a pen may be kept at room temperature for up to 30 days.  Remove the Pen from the refrigerator. Leave the base cap on until you are ready to inject.  Check the Pen label to make sure you have the right medicine and it has not . Additionally, ensure that the solution is not cloudy, discolored, or has particles in it.  Prior to administration, wash and rinse hands.   Next, choose your injection site (You may inject into your abdomen, thigh; Another person may give you the injection in your upper arm)  Change (rotate) your injection site with each dose. You may use the same area of your body, but be sure to choose a different injection site in that area.  Once administration site has been selected, use a new alcohol swab to sanitize the administration site and allow to air dry.  Pull off the orange cap  only when you are ready to inject. The orange cap can not be left off for more than 5 minutes as this can cause the medication to dry out.   Stretch or pinch your injection site to create a firm surface and place the yellow safety guard on your skin at a 90 degree angle.  Firmly push down the autoinjector onto your skin until it stops moving. When you are ready to inject press the gray start button. You will hear 2 clicks, one at the start of the injection and another once the injection is complete. Continue pushing down on your skin for about 15 seconds. The window will turn yellow when the injection is complete.   Dispose of the pen in a sharps container (Coffee can, laundry detergent bottle)  Injections will should be done on the same day every other week, if you forget you have up to 7 days to remember to do your next dose. If less than 7 days remain before the next scheduled dose, skip the missed dose and administer the next dose on the regularly scheduled day.     Jardiance Education:   Counseled patient on Jardiance MOA, expectations, side effects, duration of therapy, administration, and monitoring parameters.  Reviewed the benefits of SGLT-2i therapy, such as glycemic control and kidney and CV protection.  Advised patient to practice proper  hygiene to reduce risk of UTIs or yeast infections.  Advised patient to maintain adequate fluid intake to remain hydrated while on SGLT2i therapy.  Patient is agreeable to increasing hydration and water intake  Answered all patient questions and concerns.   Future Considerations:  Immunizations: Covid, flu, tdap  Monitoring:   Injection site redness and rash with Repatha after 8 to 10 weeks  Scr, lipids, CMP, BP, signs and symptoms of hypoglycemia   Please complete labs prior to next appointment (At least 48 hours prior)  Blood work   Urine Sample  (kidney function)

## 2024-12-09 ENCOUNTER — APPOINTMENT (OUTPATIENT)
Dept: PHARMACY | Facility: HOSPITAL | Age: 69
End: 2024-12-09
Payer: MEDICARE

## 2024-12-09 DIAGNOSIS — I10 ESSENTIAL HYPERTENSION: ICD-10-CM

## 2024-12-09 DIAGNOSIS — R73.03 PREDIABETES: Primary | ICD-10-CM

## 2024-12-09 DIAGNOSIS — E66.811 CLASS 1 OBESITY WITH BODY MASS INDEX (BMI) OF 34.0 TO 34.9 IN ADULT, UNSPECIFIED OBESITY TYPE, UNSPECIFIED WHETHER SERIOUS COMORBIDITY PRESENT: ICD-10-CM

## 2024-12-09 DIAGNOSIS — E78.00 PURE HYPERCHOLESTEROLEMIA: ICD-10-CM

## 2024-12-09 RX ORDER — EVOLOCUMAB 140 MG/ML
140 INJECTION, SOLUTION SUBCUTANEOUS
Qty: 2 ML | Refills: 11 | Status: SHIPPED | OUTPATIENT
Start: 2024-12-09 | End: 2025-12-09

## 2024-12-09 RX ORDER — HYDROCORTISONE 1 %
1 CREAM (GRAM) TOPICAL AS NEEDED
COMMUNITY

## 2024-12-09 NOTE — PATIENT INSTRUCTIONS
NAME: Zuleima Recinos   DATE: 2024     Your Pharmacist Today: Emperatriz Hough, PharmD  Your Primary Care Physician: Jaylon Callejas MD   Your Referring Provider: No ref. provider found    Thank you for your time today, Ms. Zuleima Recinos. It was a pleasure managing your health together! Below is a summary of your treatment plan, other important information, and our pharmacy team's contact numbers:  If you need to schedule or re-schedule an appointment with us, please call our scheduling line at [phone], option 1.   To schedule non-pharmacy appointments please call [phone]  If you are out of medication refills or have a medical question, please contact me at my direct line, [phone]. Please allow for up to 48 hours for a return call. You can also contact me through CrossCurrent.    DIAGNOSIS:  Pre-diabetes, Weight Loss, High Cholesterol      TREATMENT PLAN     Medication Changes:   STOP:   Jardiance (has not started therapy)  CONTINUE:   All current medications  Repatha 140 mg inject under the skin every 14 days for high cholesterol  - SENT REFILLs to [facility]      Patient Goals:  - Fasting B - 130 mg/dL  - Postprandial BG: less than 180 mg/dL  - A1c: less than 7%     Patient Instructions/Plan  Exercise daily   Please complete labs prior to [facility] appointment (At least 48 hours prior)  Fasting - Blood work   Urine Sample  (kidney function)    Follow-up Appointment:   Follow-up with me in 2 months 25 via telephone.   Please call if you have any questions, concerns, need refills, or have an issues with the copay of your medications    [facility] Follow-Up: 24    IMPORTANT INFORMATION     Please call 911 for medical emergencies.  Our pharmacy team is generally available from Monday-Friday, 8 am - 5 pm.  If you need to get in touch with me, you may either call me at my direct line or you can use CrossCurrent.  If you are unable to make your appointment, kindly call your our pharmacy scheduling  line at [phone] at least 48 hours in advance to cancel and reschedule.  There are no supporting services by the pharmacy team on weekends and holidays, or after 5 PM on weekdays.      RETAIL PHARMACY     [facility]    [address]  [place] [id number]  Phone: [phone]  Hours: M-F: 8 am - 6 pm;   Saturday: 8 am - 4 pm; Sunday 9 am - 1 pm

## 2024-12-09 NOTE — Clinical Note
NOT TAKING JARDIANCE - patient is adamant that she does not want to try medication at this time. She states she is concerned about the side effects and states she does not drink water throughout the day and is not interested in increasing her water intake. Educated about proper  hygiene, and low risk of  infections as she does not have a history of recurrent or frequent UTIs. Patient states she does not have diabetes and does not want to take jardiance. Discussed her hba1c trend and how she could benefit from this medication. Educated patient about the CV and renal benefits as well as potential weight loss benefits. Patient demonstrated understanding but remained adamant about not taking this medication at this time. She is agreeable to starting this in the future, but does not want to take this medication at this time. Encouraged her to discuss this again with ALEJANDRO SHINE and re-evaluate this at upcoming appointment  Has taken 1 dose of repatha so far and is taking the second this week. Ok to continue

## 2024-12-09 NOTE — PROGRESS NOTES
"  [facility] ([facility])   Clinical Pharmacy Appointment     [id number] Zuleima Recinos is a 69 y.o. female who presents for Follow Up appointment. Patient is sent at the request of No ref. provider found for medication management.  My final recommendations will be communicated back to the requesting provider by way of shared medical record.    Last Pharmacy Appointment: 11/08/24  Patients diabetes is needs improvement with most recent A1c of 6% (Goal < 6%). The goals of therapy are not \"being met\" with the current medication regimen.  Medication Changes:  CONTINUE: All current medications  START  Repatha 140 mg inject under the skin every 14 days for high cholesterol No refills   Jardiance 10 mg tablet take once daily for diabetes  #30 day supply with 3 refills - [facility]  Rationale:  Diabetes: Glp-1s not covered by insurance. Pre-diabetes diagnosis and no CVD risk factors   Metformin provides no CVD or kidney benefits; minimal weight loss  Jardiance has CV and kidney protection; $5 copay (patient agreeable to copay), lowers sugars, and can have weight loss (minimal 1-5% with diet/exercise)  Hyperlipidemia: Patient is ok to try repatha, she states the rash may not have been from the repatha and she felt it worked well  Can always switch to zetia   Patient encouraged to call if side effects are worrisome or if any questions or concerns come up.  Education Provided to Patient:    Repatha Monitoring and Education:  Jardiance  Immunizations: Covid, flu, tdap  Monitoring:   Injection site redness and rash with Repatha after 8 to 10 weeks  Scr, lipids, CMP, BP, signs and symptoms of hypoglycemia   Please complete labs prior to next appointment (At least 48 hours prior)    Last [facility] Appointment: 10/3/24  #Prediabetes: A1c of 6%.  Patient was not aware of her diagnosis before this most recent A1c.  Start Trulicity.  #Obesity: BMI 34 kg/m².  Dietary " advice and start Trulicity as above.  Clinical pharmacist to titrate up medications.  #Hypertension: Patient does not measure her blood pressure at home.  Her blood pressure today is in the 140s over 70s.  She is on amlodipine benazepril combination and hydrochlorothiazide.  Creatinine from late August is a little bit elevated with elevated BUN.  Increase amlodipine benazepril to 1020 mg daily.  Stop hydrochlorothiazide.  Repeat labs in 1 week.  Hyperlipidemia: Most recent lipid profile most recent lipid profile was from 8/20/2024 with total cholesterol of 392 direct LDL of 262 triglycerides of 493 -patient was on Repatha in the past but stopped due to site redness and  rash after 8 to 10 weeks of using the medication.  Restart Repatha patient is open to it.  It does not seem that it was an allergic reaction.    11/4/24:  The patient mentioned that, despite not experiencing any personal side effects, she is uncomfortable taking Repatha after reading about its potential side effects. Additionally, she has not yet received the Trulicity, despite a Clinical Pharmacy referral being submitted. When I asked about how she is monitoring her blood sugars, she states she is not and did not like the CGM sample she was given, she said it fell off and was alerting her too much.  Pharmacy Scheduling team had attempted to reach out to the patient regarding Trulicity, but she did not return their calls. She has also cancelled her dietician follow up with Neelam Nix. The patient has since called back and stated that she does not wish to take Trulicity or Repatha due to concerns about injections and potential side effects. She is now inquiring about alternative treatment options.    Zuleima Recinos is a 69 year old female referred from the [facility] clinic to pharmacy services for medication management and financial assistance with glp therapy. Her insurance does not cover obesity medications and was not eligible for  PAP  program at our initial appointment. She has pre-diabetes with an A1c of 6, has a bmi of 43, hypertension on on amlodipine benazepril combination and hydrochlorothiazide, and hyperlipidemia on repatha (restarting therapy). The patient has an allergy to statins. Today,we will assess how she tolerated restarting therapy with repatha and beginning therapy with jardiance.     Past Medical History:  She has a past medical history of Functional dyspepsia (05/22/2018), Personal history of other diseases of the circulatory system (12/16/2017), Personal history of other diseases of the digestive system (09/10/2019), and Personal history of other diseases of the musculoskeletal system and connective tissue (12/16/2017). Patient has prediabetes, hyperlipidemia, hypertension, and obesity.     Past Surgical History:  She has a past surgical history that includes Other surgical history (02/01/2021).    Social History:  She reports that she quit smoking about 44 years ago. Her smoking use included cigarettes. She has never used smokeless tobacco. She reports that she does not currently use alcohol. She reports that she does not use drugs.    Family History:  No family history on file.    Allergies:  Statins-hmg-coa reductase inhibitors, Cefuroxime, Evolocumab, and Latex    Medication Reconciliation:   NOT TAKING JARDIANCE - patient is adamant that she does not want to try medication at this time. She states she is concerned about the side effects and states she does not drink water throughout the day and is not interested in increasing her water intake.   Educated about proper  hygiene, and low risk of  infections as she does not have a history of recurrent or frequent UTIs  Patient states she does not have diabetes and does not want to take jardiance. Discussed her hba1c trend and how she could benefit from this medication.   Educated patient about the CV and renal benefits as well as potential weight loss benefits. Patient  demonstrated understanding but remained adamant about not taking this medication at this time  She is agreeable to starting this in the future, but does not want to take this medication at this time. Encouraged her to discuss this again with ALEJANDRO SHINE and re-evaluate this at upcoming appointment  Will continue to encourage and educate the patient on the benefits and low risk of ADRS of jardiance and to continue to work on lifestyle changes. Patient wants to focus on lifestyle changes at this time. She also wishes to wait for updated labs to assess medication changes    Adherence:  Previously had a rash after 8-10 weeks of repatha, restarted after last CINEMA appt and did not experience any personal side effects, but stopped taking it as she is uncomfortable using Repatha after reading about its potential side effects.   Patient has taken 1 dose of repatha so far and is taking the second this week  Has not taken Jardiance    Affordability:   Initial  PAP screening showed patient was ineligible but I will re-screen at the next appointment (2025)  Trulicity: PA denied  Test Claims:  Mounjaro: Requires PA  Trulicity PA Denied prior (needs DM diagnosis)  Zepbound - not covered  Wegovy - not covered  Ozempic - needs diabetes diagnosis  Metformin: $0 copay  Jardiance: $4.60 copay  Zetia: $0 copay    Pre-Diabetes Pharmacotherapy:    Repatha 140 mg injection every 14 days    Adverse Effects:   Repatha caused rednesss and itchiness at the injection site that was present for almost 2 weeks.   Patient states it is not bothersome and the itchiiness resolves upon using OTC hydrocortisone. Rash has since resolved since the first injection but does have her second injection within the next few days. Patient is agreeable to continuing the repatha    Current monitoring regimen:   Patient is using: Not monitoring BG  As of 11/4 the patient is not monitoring her blood sugars, she states she is not and did not like the CGM sample she  was given, she said it fell off and was alerting her too much.  Any episodes of hypoglycemia? Denies signs and symptoms     Primary/Secondary Prevention:   - Statin? No intolerance, but is on repatha  - ACE-I/ARB? Yes  - Aspirin? No    Pertinent PMH Review:  - PMH of Pancreatitis: No  - PMH of Retinopathy: No  - PMH of Urinary Tract Infections: No  - PMH of MTC: No    Objective     Last Recorded Vitals:  BP Readings from Last 6 Encounters:   10/03/24 142/70   08/05/24 124/72   07/24/24 120/58   07/05/24 (!) 160/96   06/20/24 152/86   12/19/23 140/90       Wt Readings from Last 6 Encounters:   10/03/24 81.6 kg (180 lb)   08/05/24 83.9 kg (185 lb)   07/24/24 84.1 kg (185 lb 6.5 oz)   07/05/24 85.1 kg (187 lb 9.6 oz)   06/20/24 81.6 kg (180 lb)   05/30/24 79.8 kg (176 lb)     Labs:   Lab Results   Component Value Date    BILITOT 0.6 08/20/2024    CALCIUM 9.9 08/20/2024    CO2 24 08/20/2024    CL 98 08/20/2024    CREATININE 1.19 (H) 08/20/2024    GLUCOSE 115 (H) 08/20/2024    ALKPHOS 66 08/20/2024    K 3.9 08/20/2024    PROT 7.9 08/20/2024     08/20/2024    AST 28 08/20/2024    ALT 26 08/20/2024    BUN 24 (H) 08/20/2024    ANIONGAP 18 08/20/2024    ALBUMIN 5.0 08/20/2024    GFRF 68 11/07/2022     Lab Results   Component Value Date    TRIG 493 (H) 08/20/2024    CHOL 392 (H) 08/20/2024    LDLCALC  08/20/2024      Comment:      The calculation of LDL and VLDL are inaccurate when the Triglycerides are greater than 400 mg/dL or when the patient is non-fasting. If LDL measurement is necessary contact the testing laboratory for an alternative LDL assay.                                  Near   Borderline      AGE      Desirable  Optimal    High     High     Very High     0-19 Y     0 - 109     ---    110-129   >/= 130     ----    20-24 Y     0 - 119     ---    120-159   >/= 160     ----      >24 Y     0 -  99   100-129  130-159   160-189     >/=190      HDL 38.7 08/20/2024     Lab Results   Component Value Date    [facility]  "6.0 (H) 08/20/2024    HGBA1C 5.9 (H) 07/05/2024    HGBA1C 5.9 (A) 11/07/2022     The ASCVD Risk score (Jose E POON, et al., 2019) failed to calculate for the following reasons:    The valid total cholesterol range is 130 to 320 mg/dL  No components found for: \"UACR\"    Health Maintenance:   Lipid Panel: due  Urine Albumin/Creatinine Ratio: due    Assessment/Plan     Pre-DIABETES    Patients diabetes is needs improvement with most recent A1c of 6% (Goal < 6%). The goals of therapy are not \"being met\" with the current medication regimen.  Reinforced healthy diet, lifestyle, and exercise.  Prescription medication ordered.  Patient refused Jardiance.    Medication Changes:  STOP: Jardiance  Patient does not want to start this medication and wishes to evaluate this in the future   CONTINUE:   All current medications  Repatha 140 mg inject under the skin every 14 days for high cholesterol  - SENT REFILLs to [facility] per patient request    Rationale:  Diabetes  Glp-1s not covered by insurance. Pre-diabetes diagnosis and no CVD risk factors   Metformin provides no CVD or kidney benefits; minimal weight loss  Will assess SGLT2 at every follow up as patient is not agreeable to begin therapy at this time  Jardiance has CV and kidney protection; $5 copay (patient agreeable to copay), lowers sugars, and can have weight loss  Hyperlipidemia  Patient is agreeable to continue repatha  Compliance at present is estimated to be good.   Efforts to improve compliance (if necessary) will be directed at  education. Patient encouraged to call if side effects are worrisome or if any questions or concerns come up .  Education Provided to Patient:    Repatha Monitoring and Education  Jardiance  Benefits and ADR risk/benefit   Risk of heart attack and stroke   Risk of diabetess   Future Considerations:  Immunizations: Covid, flu, tdap  Monitoring:   Injection site redness and rash with Repatha after 8 to 10 weeks  Scr, lipids, CMP, BP, signs " and symptoms of hypoglycemia   Please complete labs prior to next appointment (At least 48 hours prior)  Blood work   Urine Sample  (kidney function)  Follow-up: I recommend diabetes care be 2 months after follow up CINEMA appointment in order to check in on agreeableness to therapy plan and to help with any concerns or financial difficulties.   2/5/25 via telephone  [facility] Follow-Up: 1/16/24    Emperatriz Hough PharmD   Clinical Pharmacist Specialist, Primary Care   Phone: [phone]   Fax: [phone]   Email: [email or web address]    Continue all meds under the continuation of care with the referring provider and clinical pharmacy team.

## 2024-12-09 NOTE — ASSESSMENT & PLAN NOTE
"  Pre-DIABETES    Patients diabetes is needs improvement with most recent A1c of 6% (Goal < 6%). The goals of therapy are not \"being met\" with the current medication regimen.  Reinforced healthy diet, lifestyle, and exercise.  Prescription medication ordered.  Patient refused Jardiance.    Medication Changes:  STOP: Jardiance  Patient does not want to start this medication and wishes to evaluate this in the future   CONTINUE:   All current medications  Repatha 140 mg inject under the skin every 14 days for high cholesterol  - SENT REFILLs to [facility] per patient request    Rationale:  Diabetes  Glp-1s not covered by insurance. Pre-diabetes diagnosis and no CVD risk factors   Metformin provides no CVD or kidney benefits; minimal weight loss  Will assess SGLT2 at every follow up as patient is not agreeable to begin therapy at this time  Jardiance has CV and kidney protection; $5 copay (patient agreeable to copay), lowers sugars, and can have weight loss  Hyperlipidemia  Patient is agreeable to continue repatha  Compliance at present is estimated to be good.   Efforts to improve compliance (if necessary) will be directed at education. Patient encouraged to call if side effects are worrisome or if any questions or concerns come up.  Education Provided to Patient:    Repatha Monitoring and Education  Jardiance  Benefits and ADR risk/benefit   Risk of heart attack and stroke   Risk of diabetess   Future Considerations:  Immunizations: Covid, flu, tdap  Monitoring:   Injection site redness and rash with Repatha after 8 to 10 weeks  Scr, lipids, CMP, BP, signs and symptoms of hypoglycemia   Please complete labs prior to next appointment (At least 48 hours prior)  Blood work   Urine Sample  (kidney function)  Follow-up: I recommend diabetes care be 2 months after follow up [facility] appointment in order to check in on agreeableness to therapy plan and to help with any concerns or financial difficulties.   2/5/25 via " telephone  [facility] Follow-Up: 1/16/24

## 2024-12-13 ENCOUNTER — DOCUMENTATION (OUTPATIENT)
Dept: PHARMACY | Facility: HOSPITAL | Age: 69
End: 2024-12-13
Payer: MEDICARE

## 2024-12-13 NOTE — PROGRESS NOTES
I was contacted today by Armin regarding a prior authorization. This prior authorization was submitted and approved in September. Faxed copy to [phone] which is the location the prescription was sent to [address] [id number]     Zuleima Recinos has been approved for prior authorization for repatha. Patient has been contacted regarding the status of their prior authorization.     Date: 12/13/2024   Member ID #: [id number]   Authorization #: [id number]    Approval Duration: 9/13/24 - 12/13/25        Please contact clinical pharmacy with any further questions. Thank you.    Emperatriz Hough, PharmD   Clinical Pharmacist Specialist, Primary Care   Phone: [phone]   Fax: [phone]   Email: [email or web address]

## 2024-12-29 DIAGNOSIS — E87.6 HYPOKALEMIA: ICD-10-CM

## 2024-12-30 RX ORDER — POTASSIUM CHLORIDE 20 MEQ/1
TABLET, EXTENDED RELEASE ORAL
Qty: 60 TABLET | Refills: 6 | Status: SHIPPED | OUTPATIENT
Start: 2024-12-30

## 2025-01-10 ENCOUNTER — APPOINTMENT (OUTPATIENT)
Dept: PRIMARY CARE | Facility: CLINIC | Age: 70
End: 2025-01-10
Payer: MEDICARE

## 2025-01-12 PROBLEM — M54.12 BILATERAL CERVICAL RADICULOPATHY: Status: ACTIVE | Noted: 2025-01-12

## 2025-01-12 NOTE — PROGRESS NOTES
Subjective   Patient ID: Zuleima Recinos is a 69 y.o. female who presents for follow-up visit    HPI   The patient reports a history of constant aching pain throughout the entire left upper extremity x 1 month.  Over the past several months, she still reports continued chronic constant aching pain throughout the entire right upper extremity-unchanged.  She does report over the past month increased weakness in both upper extremities.  Over the past month, she reports intermittent episodes of numbness in the fingers of both hands...  She reports no precipitating, exacerbating, relieving factors..  Also, over the past several months, she reports intermittent episodes of pain-unable to describe-in the neck and shoulders.  She reports no precipitating, exacerbating, relieving factors.  No other associated symptoms.  Review of Systems    Objective   There were no vitals taken for this visit.    Physical Exam  Musculoskeletal  Cervical spine-no erythema or swelling.  Full range of motion with mild discomfort noted on rotation bilaterally, flexion and extension.  Full range of motion with no discomfort noted in all other directions of motion.  Palpation revealed tenderness over the posterior surface of the neck but also throughout both upper back regions, no increase in warmth    Neurologic  Upper extremities:  Motor-strength 5/5 in all muscle groups tested  Sensory  Light touch sensation fully intact  Pinprick sensation fully intact  Reflexes-2+/4 bilaterally  Assessment/Plan   Problem List Items Addressed This Visit             ICD-10-CM    Essential hypertension I10    Fibromyalgia M79.7    Polyarthralgia M25.50    Bilateral cervical radiculopathy - Primary M54.12     Assessment  Constant aching pain noted throughout the entire left upper extremity, chronic constant aching pain throughout the entire right upper extremity with associated bilateral upper extremity weakness, intermittent episodes of numbness in the  fingers of both hands-likely secondary to bilateral cervical radiculopathies secondary to bilateral neuroforaminal stenosis cervical spine  Chronic intermittent episodes of pain-unable describe-in the neck and shoulders-May be secondary to lateral neuroforaminal stenosis cervical spine  Bilateral neuroforaminal stenosis C3-C4, C4-C5, C5-C6, C6-C7  Plan  Begin Medrol Dosepak as directed.  Begin duloxetine 20 mg daily  The patient will return in 1 month for a follow-up visit and routine lab work

## 2025-01-13 ENCOUNTER — APPOINTMENT (OUTPATIENT)
Dept: PRIMARY CARE | Facility: CLINIC | Age: 70
End: 2025-01-13
Payer: MEDICARE

## 2025-01-14 ENCOUNTER — LAB (OUTPATIENT)
Dept: LAB | Facility: LAB | Age: 70
End: 2025-01-14
Payer: MEDICARE

## 2025-01-14 ENCOUNTER — APPOINTMENT (OUTPATIENT)
Dept: PRIMARY CARE | Facility: CLINIC | Age: 70
End: 2025-01-14
Payer: MEDICARE

## 2025-01-14 VITALS
DIASTOLIC BLOOD PRESSURE: 76 MMHG | HEIGHT: 61 IN | HEART RATE: 88 BPM | SYSTOLIC BLOOD PRESSURE: 110 MMHG | WEIGHT: 176.8 LBS | TEMPERATURE: 97.8 F | BODY MASS INDEX: 33.38 KG/M2

## 2025-01-14 DIAGNOSIS — E66.811 CLASS 1 OBESITY WITH BODY MASS INDEX (BMI) OF 34.0 TO 34.9 IN ADULT, UNSPECIFIED OBESITY TYPE, UNSPECIFIED WHETHER SERIOUS COMORBIDITY PRESENT: ICD-10-CM

## 2025-01-14 DIAGNOSIS — M25.50 POLYARTHRALGIA: ICD-10-CM

## 2025-01-14 DIAGNOSIS — K30 FUNCTIONAL DYSPEPSIA: ICD-10-CM

## 2025-01-14 DIAGNOSIS — R73.03 PREDIABETES: ICD-10-CM

## 2025-01-14 DIAGNOSIS — M79.7 FIBROMYALGIA: ICD-10-CM

## 2025-01-14 DIAGNOSIS — E78.00 PURE HYPERCHOLESTEROLEMIA: ICD-10-CM

## 2025-01-14 DIAGNOSIS — I10 ESSENTIAL HYPERTENSION: ICD-10-CM

## 2025-01-14 DIAGNOSIS — M54.12 BILATERAL CERVICAL RADICULOPATHY: Primary | ICD-10-CM

## 2025-01-14 LAB
ALBUMIN SERPL BCP-MCNC: 5.2 G/DL (ref 3.4–5)
ALP SERPL-CCNC: 63 U/L (ref 33–136)
ALT SERPL W P-5'-P-CCNC: 22 U/L (ref 7–45)
ANION GAP SERPL CALC-SCNC: 15 MMOL/L (ref 10–20)
AST SERPL W P-5'-P-CCNC: 23 U/L (ref 9–39)
BASOPHILS # BLD AUTO: 0.02 X10*3/UL (ref 0–0.1)
BASOPHILS NFR BLD AUTO: 0.3 %
BILIRUB SERPL-MCNC: 0.7 MG/DL (ref 0–1.2)
BUN SERPL-MCNC: 19 MG/DL (ref 6–23)
CALCIUM SERPL-MCNC: 10 MG/DL (ref 8.6–10.6)
CHLORIDE SERPL-SCNC: 102 MMOL/L (ref 98–107)
CHOLEST SERPL-MCNC: 325 MG/DL (ref 0–199)
CHOLESTEROL/HDL RATIO: 8.4
CO2 SERPL-SCNC: 24 MMOL/L (ref 21–32)
CREAT SERPL-MCNC: 1.02 MG/DL (ref 0.5–1.05)
CREAT UR-MCNC: 225.3 MG/DL (ref 20–320)
EGFRCR SERPLBLD CKD-EPI 2021: 59 ML/MIN/1.73M*2
EOSINOPHIL # BLD AUTO: 0.03 X10*3/UL (ref 0–0.4)
EOSINOPHIL NFR BLD AUTO: 0.5 %
ERYTHROCYTE [DISTWIDTH] IN BLOOD BY AUTOMATED COUNT: 13 % (ref 11.5–14.5)
EST. AVERAGE GLUCOSE BLD GHB EST-MCNC: 120 MG/DL
GLUCOSE SERPL-MCNC: 105 MG/DL (ref 74–99)
HBA1C MFR BLD: 5.8 %
HCT VFR BLD AUTO: 37.2 % (ref 36–46)
HDLC SERPL-MCNC: 38.9 MG/DL
HGB BLD-MCNC: 12.5 G/DL (ref 12–16)
IMM GRANULOCYTES # BLD AUTO: 0.03 X10*3/UL (ref 0–0.5)
IMM GRANULOCYTES NFR BLD AUTO: 0.5 % (ref 0–0.9)
LDLC SERPL CALC-MCNC: 216 MG/DL
LYMPHOCYTES # BLD AUTO: 2.22 X10*3/UL (ref 0.8–3)
LYMPHOCYTES NFR BLD AUTO: 35 %
MCH RBC QN AUTO: 32.3 PG (ref 26–34)
MCHC RBC AUTO-ENTMCNC: 33.6 G/DL (ref 32–36)
MCV RBC AUTO: 96 FL (ref 80–100)
MICROALBUMIN UR-MCNC: 23.7 MG/L
MICROALBUMIN/CREAT UR: 10.5 UG/MG CREAT
MONOCYTES # BLD AUTO: 0.47 X10*3/UL (ref 0.05–0.8)
MONOCYTES NFR BLD AUTO: 7.4 %
NEUTROPHILS # BLD AUTO: 3.58 X10*3/UL (ref 1.6–5.5)
NEUTROPHILS NFR BLD AUTO: 56.3 %
NON HDL CHOLESTEROL: 286 MG/DL (ref 0–149)
NRBC BLD-RTO: 0 /100 WBCS (ref 0–0)
PLATELET # BLD AUTO: 274 X10*3/UL (ref 150–450)
POTASSIUM SERPL-SCNC: 4.6 MMOL/L (ref 3.5–5.3)
PROT SERPL-MCNC: 7.9 G/DL (ref 6.4–8.2)
RBC # BLD AUTO: 3.87 X10*6/UL (ref 4–5.2)
SODIUM SERPL-SCNC: 136 MMOL/L (ref 136–145)
TRIGL SERPL-MCNC: 349 MG/DL (ref 0–149)
VLDL: 70 MG/DL (ref 0–40)
WBC # BLD AUTO: 6.4 X10*3/UL (ref 4.4–11.3)

## 2025-01-14 PROCEDURE — 3008F BODY MASS INDEX DOCD: CPT | Performed by: INTERNAL MEDICINE

## 2025-01-14 PROCEDURE — 3078F DIAST BP <80 MM HG: CPT | Performed by: INTERNAL MEDICINE

## 2025-01-14 PROCEDURE — 82570 ASSAY OF URINE CREATININE: CPT

## 2025-01-14 PROCEDURE — 99213 OFFICE O/P EST LOW 20 MIN: CPT | Performed by: INTERNAL MEDICINE

## 2025-01-14 PROCEDURE — 82043 UR ALBUMIN QUANTITATIVE: CPT

## 2025-01-14 PROCEDURE — 3074F SYST BP LT 130 MM HG: CPT | Performed by: INTERNAL MEDICINE

## 2025-01-14 PROCEDURE — 83036 HEMOGLOBIN GLYCOSYLATED A1C: CPT

## 2025-01-14 PROCEDURE — 80061 LIPID PANEL: CPT

## 2025-01-14 PROCEDURE — 80053 COMPREHEN METABOLIC PANEL: CPT

## 2025-01-14 PROCEDURE — 85025 COMPLETE CBC W/AUTO DIFF WBC: CPT

## 2025-01-14 RX ORDER — AMLODIPINE AND BENAZEPRIL HYDROCHLORIDE 10; 20 MG/1; MG/1
1 CAPSULE ORAL DAILY
COMMUNITY

## 2025-01-14 RX ORDER — DULOXETIN HYDROCHLORIDE 20 MG/1
20 CAPSULE, DELAYED RELEASE ORAL DAILY
Qty: 30 CAPSULE | Refills: 3 | Status: SHIPPED | OUTPATIENT
Start: 2025-01-14 | End: 2025-05-14

## 2025-01-14 RX ORDER — METHYLPREDNISOLONE 4 MG/1
TABLET ORAL
Qty: 21 TABLET | Refills: 0 | Status: SHIPPED | OUTPATIENT
Start: 2025-01-14 | End: 2025-01-20

## 2025-01-16 ENCOUNTER — APPOINTMENT (OUTPATIENT)
Dept: CARDIOLOGY | Facility: CLINIC | Age: 70
End: 2025-01-16
Payer: MEDICARE

## 2025-01-24 ENCOUNTER — NURSE ONLY (OUTPATIENT)
Dept: CARDIOLOGY | Facility: HOSPITAL | Age: 70
End: 2025-01-24
Payer: MEDICARE

## 2025-02-05 ENCOUNTER — APPOINTMENT (OUTPATIENT)
Dept: PHARMACY | Facility: HOSPITAL | Age: 70
End: 2025-02-05
Payer: MEDICARE

## 2025-02-06 DIAGNOSIS — E87.6 HYPOKALEMIA: ICD-10-CM

## 2025-02-06 DIAGNOSIS — I10 ESSENTIAL HYPERTENSION: Primary | ICD-10-CM

## 2025-02-06 RX ORDER — AMLODIPINE AND BENAZEPRIL HYDROCHLORIDE 10; 20 MG/1; MG/1
1 CAPSULE ORAL DAILY
Qty: 90 CAPSULE | Refills: 1 | Status: SHIPPED | OUTPATIENT
Start: 2025-02-06

## 2025-02-06 RX ORDER — POTASSIUM CHLORIDE 20 MEQ/1
20 TABLET, EXTENDED RELEASE ORAL DAILY
Qty: 90 TABLET | Refills: 3 | Status: SHIPPED | OUTPATIENT
Start: 2025-02-06

## 2025-02-12 ENCOUNTER — APPOINTMENT (OUTPATIENT)
Dept: PRIMARY CARE | Facility: CLINIC | Age: 70
End: 2025-02-12
Payer: MEDICARE

## 2025-02-18 NOTE — DISCHARGE INSTRUCTIONS
Post-injection instructions:    Your pain may not be gone immediately after the procedure--it usually takes the steroid 3-5 days to start working.   It may take several weeks for the medicine to reach its' full effect.   Pay attention to how much pain relief (what percentage compared to before the procedure) you get and for how long it lasts.   We will ask you for this at the follow up visit.     Activity: Avoid strenuous activity for 24 hours. After that return to your normal activity level.     Bandages: Remove tomorrow    Showering/Bathing: You may shower after bandage is removed     Follow up: With Dr. Hyatt over the phone in one week to discuss how you are doing  Call Summer to Schedule.   Summer's Phone:  [phone]    After hours, call the   ([phone]) and ask for the pain management answering service if you notice any of the below:     Call the doctor immediately: if you notice:    Excessive bleeding from procedure site (brisk bright red bleeding from the site or bleeding that soaks the bandages or does not stop)   Severe headache  Inability to walk, leg or arm weakness or numbness that is significantly worse after the procedure   Uncontrolled pain   Inability to control your bowels or bladder   Signs of infection: Fever above 101.5F, redness, swelling, pus or drainage from the site    
moist

## 2025-03-27 DIAGNOSIS — I10 ESSENTIAL HYPERTENSION: ICD-10-CM

## 2025-03-27 DIAGNOSIS — E78.00 PURE HYPERCHOLESTEROLEMIA: ICD-10-CM

## 2025-03-27 DIAGNOSIS — E87.6 HYPOKALEMIA: ICD-10-CM

## 2025-03-27 DIAGNOSIS — R73.03 PREDIABETES: ICD-10-CM

## 2025-03-27 DIAGNOSIS — E55.9 VITAMIN D DEFICIENCY: ICD-10-CM

## 2025-03-27 DIAGNOSIS — R53.82 CHRONIC FATIGUE: ICD-10-CM

## 2025-03-27 DIAGNOSIS — K58.2 IRRITABLE BOWEL SYNDROME WITH BOTH CONSTIPATION AND DIARRHEA: ICD-10-CM

## 2025-03-27 RX ORDER — FAMOTIDINE 40 MG/1
TABLET, FILM COATED ORAL
Qty: 180 TABLET | Refills: 2 | Status: SHIPPED | OUTPATIENT
Start: 2025-03-27

## 2025-04-30 DIAGNOSIS — J01.00 ACUTE NON-RECURRENT MAXILLARY SINUSITIS: Primary | ICD-10-CM

## 2025-04-30 RX ORDER — AMOXICILLIN AND CLAVULANATE POTASSIUM 875; 125 MG/1; MG/1
875 TABLET, FILM COATED ORAL 2 TIMES DAILY
Qty: 14 TABLET | Refills: 0 | Status: SHIPPED | OUTPATIENT
Start: 2025-04-30 | End: 2025-05-07

## 2025-04-30 NOTE — PROGRESS NOTES
Subjective   Patient ID: Zuleima Recinos is a 69 y.o. female who presents for intermittent episodes of pulsating pain left ear    HPI   The patient reports a history of a cough productive of yellow sputum, nasal congestion, purulent nasal discharge, copious postnasal drip, constant blocked sensation in the head and intermittent episodes of pulsating pain in the left ear x 1 week.  She reports no other associated symptoms.  She has not yet tested for COVID-19.  She is using Robitussin DM, Allegra, Flonase.  Review of Systems    Objective   There were no vitals taken for this visit.    Physical Exam  General-the patient is alert and oriented x 3 and is in no apparent distress.  She is able to speak comfortably in full sentences.  HEENT  Nose-the patient does sound nasally congested  Assessment/Plan        Assessment  Cough, nasal congestion, purulent nasal discharge, postnasal drip, constant blocked sensation in the head-May be secondary to sinusitis, viral syndrome  Intermittent episodes of a pulsating pain in the left ear-May be secondary to eustachian tube dysfunction secondary to sinusitis, viral syndrome.  Plan  I told the patient to take a home COVID test.  If the test is negative, I will start the patient on Augmentin 875 mg p.o. twice daily x 7 days.  I told the patient that she could take over-the-counter phenylephrine 10 mg p.o. every 6 hours as needed or Sudafed 30 mg p.o. every 6 hours as needed.  She will call me in 1 week with her condition

## 2025-05-11 DIAGNOSIS — I10 ESSENTIAL HYPERTENSION: ICD-10-CM

## 2025-05-12 RX ORDER — AMLODIPINE AND BENAZEPRIL HYDROCHLORIDE 10; 20 MG/1; MG/1
1 CAPSULE ORAL DAILY
Qty: 90 CAPSULE | Refills: 1 | Status: SHIPPED | OUTPATIENT
Start: 2025-05-12

## 2025-06-18 ENCOUNTER — APPOINTMENT (OUTPATIENT)
Dept: PRIMARY CARE | Facility: CLINIC | Age: 70
End: 2025-06-18
Payer: MEDICARE

## 2025-06-18 NOTE — PROGRESS NOTES
Subjective   Patient ID: Zuleima Recinos is a 69 y.o. female who presents for crackling in ear, heartbeat in ear    HPI   The patient reports a history of intermittent episodes of cracking in both ears x 6 months.  She reports that the episodes can be precipitated by inspiration.  She reports also a history of intermittent episodes of a pulsation in the left ear times a few months.  She reports no precipitating, exacerbating, relieving factors.  She reports no associated hearing loss or ear pain.  No other associated symptoms.    Since her last office visit, the patient reports continued chronic constant aching pain throughout both upper extremities-unchanged.  Since her last office visit, she reports continued chronic weakness in both upper extremities-unchanged.  Since her last office visit, she reports continued chronic intermittent episodes of numbness in the fingers of both hands-unchanged.  Also, since her last office visit, she reports chronic intermittent episodes of pain-unable to describe-in the neck and shoulders.  She still reports no precipitating, exacerbating, relieving factors.  She reports no associated symptoms and over the past 5.5 months reports no change in the frequency or intensity of the episodes.  The patient did take duloxetine for a short period of time but did not feel right on the medication and discontinued the medication.  Review of Systems    Objective   There were no vitals taken for this visit.    Physical Exam  Ears-palpation of pinnas and tragus is revealed no tenderness.  External auditory canals not erythematous or swollen but are extremely narrow, TMs not visualized secondary to narrow external auditory canals  Neck-no lymphadenopathy.  Thyroid gland not enlarged, no bruits  Musculoskeletal  Cervical spine-no erythema or swelling.  Full range of motion with mild discomfort noted on rotation and bending bilaterally.  Full range of motion with no discomfort noted in all other  directions of motion.  Palpation revealed tenderness over the posterior surface of the neck but also throughout both upper back regions, no increase in warmth     Neurologic  Upper extremities:  Motor-strength 5/5 in all muscle groups tested  Sensory  Light touch sensation fully intact  Pinprick sensation fully intact  Reflexes-2+/4 bilaterally  Assessment/Plan   Problem List Items Addressed This Visit           ICD-10-CM    Essential hypertension - Primary I10    Fibromyalgia M79.7    Relevant Medications    topiramate (Topamax) 25 mg tablet    Other Relevant Orders    Referral to Pain Medicine    Hyperlipidemia E78.5    Prediabetes R73.03    Polyarthralgia M25.50     Other Visit Diagnoses         Codes      Pulsatile tinnitus     H93.A9    Relevant Orders    Referral to ENT             Assessment  Hypertension-blood pressure at goal  Intermittent episodes of crackling sensation in both ears-unsure of etiology  Intermittent episodes of a pulsation in the left ear-May be secondary to pulsatile tinnitus-etiology unclear.  Gastroesophageal reflux  Metabolic associated fatty liver disease  Prediabetes  Hypercholesterolemia  Chronic constant aching pain noted throughout the entire left upper extremity, chronic constant aching pain throughout the entire right upper extremity with associated bilateral upper extremity weakness, intermittent episodes of numbness in the fingers of both hands-likely secondary to bilateral cervical radiculopathies secondary to bilateral neuroforaminal stenosis cervical spine  Chronic intermittent episodes of pain-unable describe-in the neck and shoulders-May be secondary to lateral neuroforaminal stenosis cervical spine  Bilateral neuroforaminal stenosis C3-C4, C4-C5, C5-C6, C6-C7    Plan  Obtain CMP, hemoglobin A1c, fasting lipid profile today  Refer to ENT  Refer back to pain medicine  Begin topiramate 25 mg p.o. nightly  Continue all other current medications for now.

## 2025-06-19 ENCOUNTER — APPOINTMENT (OUTPATIENT)
Dept: PRIMARY CARE | Facility: CLINIC | Age: 70
End: 2025-06-19
Payer: MEDICARE

## 2025-06-19 VITALS
SYSTOLIC BLOOD PRESSURE: 116 MMHG | BODY MASS INDEX: 30.61 KG/M2 | TEMPERATURE: 97.3 F | HEART RATE: 88 BPM | WEIGHT: 162 LBS | DIASTOLIC BLOOD PRESSURE: 74 MMHG

## 2025-06-19 DIAGNOSIS — M79.7 FIBROMYALGIA: ICD-10-CM

## 2025-06-19 DIAGNOSIS — H93.A9 PULSATILE TINNITUS: ICD-10-CM

## 2025-06-19 DIAGNOSIS — E78.00 PURE HYPERCHOLESTEROLEMIA: ICD-10-CM

## 2025-06-19 DIAGNOSIS — M25.50 POLYARTHRALGIA: ICD-10-CM

## 2025-06-19 DIAGNOSIS — R73.03 PREDIABETES: ICD-10-CM

## 2025-06-19 DIAGNOSIS — N18.31 CHRONIC KIDNEY DISEASE, STAGE 3A (MULTI): ICD-10-CM

## 2025-06-19 DIAGNOSIS — I10 ESSENTIAL HYPERTENSION: Primary | ICD-10-CM

## 2025-06-19 PROCEDURE — 99213 OFFICE O/P EST LOW 20 MIN: CPT | Performed by: INTERNAL MEDICINE

## 2025-06-19 PROCEDURE — 3074F SYST BP LT 130 MM HG: CPT | Performed by: INTERNAL MEDICINE

## 2025-06-19 PROCEDURE — 3078F DIAST BP <80 MM HG: CPT | Performed by: INTERNAL MEDICINE

## 2025-06-19 RX ORDER — TOPIRAMATE 25 MG/1
25 TABLET, FILM COATED ORAL NIGHTLY
Qty: 30 TABLET | Refills: 2 | Status: SHIPPED | OUTPATIENT
Start: 2025-06-19 | End: 2025-09-17

## 2025-06-20 LAB
ALBUMIN SERPL-MCNC: 5.3 G/DL (ref 3.6–5.1)
ALP SERPL-CCNC: 57 U/L (ref 37–153)
ALT SERPL-CCNC: 16 U/L (ref 6–29)
ANION GAP SERPL CALCULATED.4IONS-SCNC: 12 MMOL/L (CALC) (ref 7–17)
AST SERPL-CCNC: 16 U/L (ref 10–35)
BILIRUB SERPL-MCNC: 0.5 MG/DL (ref 0.2–1.2)
BUN SERPL-MCNC: 25 MG/DL (ref 7–25)
CALCIUM SERPL-MCNC: 10.4 MG/DL (ref 8.6–10.4)
CHLORIDE SERPL-SCNC: 103 MMOL/L (ref 98–110)
CHOLEST SERPL-MCNC: 191 MG/DL
CHOLEST/HDLC SERPL: 4.1 (CALC)
CO2 SERPL-SCNC: 23 MMOL/L (ref 20–32)
CREAT SERPL-MCNC: 0.94 MG/DL (ref 0.6–1)
EGFRCR SERPLBLD CKD-EPI 2021: 65 ML/MIN/1.73M2
EST. AVERAGE GLUCOSE BLD GHB EST-MCNC: 123 MG/DL
EST. AVERAGE GLUCOSE BLD GHB EST-SCNC: 6.8 MMOL/L
GLUCOSE SERPL-MCNC: 109 MG/DL (ref 65–99)
HBA1C MFR BLD: 5.9 %
HDLC SERPL-MCNC: 47 MG/DL
LDLC SERPL CALC-MCNC: 110 MG/DL (CALC)
NONHDLC SERPL-MCNC: 144 MG/DL (CALC)
POTASSIUM SERPL-SCNC: 4.2 MMOL/L (ref 3.5–5.3)
PROT SERPL-MCNC: 7.6 G/DL (ref 6.1–8.1)
SODIUM SERPL-SCNC: 138 MMOL/L (ref 135–146)
TRIGL SERPL-MCNC: 226 MG/DL

## 2025-07-09 ENCOUNTER — OFFICE VISIT (OUTPATIENT)
Dept: PAIN MEDICINE | Facility: CLINIC | Age: 70
End: 2025-07-09
Payer: MEDICARE

## 2025-07-09 VITALS
BODY MASS INDEX: 30.58 KG/M2 | HEART RATE: 70 BPM | WEIGHT: 162 LBS | RESPIRATION RATE: 18 BRPM | OXYGEN SATURATION: 98 % | SYSTOLIC BLOOD PRESSURE: 118 MMHG | HEIGHT: 61 IN | DIASTOLIC BLOOD PRESSURE: 64 MMHG

## 2025-07-09 DIAGNOSIS — M67.911 TENDINOPATHY OF RIGHT ROTATOR CUFF: Primary | ICD-10-CM

## 2025-07-09 DIAGNOSIS — M79.7 FIBROMYALGIA: ICD-10-CM

## 2025-07-09 DIAGNOSIS — M67.921 TENDINOPATHY OF RIGHT BICEPS TENDON: ICD-10-CM

## 2025-07-09 PROCEDURE — 1036F TOBACCO NON-USER: CPT | Performed by: STUDENT IN AN ORGANIZED HEALTH CARE EDUCATION/TRAINING PROGRAM

## 2025-07-09 PROCEDURE — 99214 OFFICE O/P EST MOD 30 MIN: CPT | Performed by: STUDENT IN AN ORGANIZED HEALTH CARE EDUCATION/TRAINING PROGRAM

## 2025-07-09 PROCEDURE — G2211 COMPLEX E/M VISIT ADD ON: HCPCS | Performed by: STUDENT IN AN ORGANIZED HEALTH CARE EDUCATION/TRAINING PROGRAM

## 2025-07-09 PROCEDURE — 3074F SYST BP LT 130 MM HG: CPT | Performed by: STUDENT IN AN ORGANIZED HEALTH CARE EDUCATION/TRAINING PROGRAM

## 2025-07-09 PROCEDURE — 1159F MED LIST DOCD IN RCRD: CPT | Performed by: STUDENT IN AN ORGANIZED HEALTH CARE EDUCATION/TRAINING PROGRAM

## 2025-07-09 PROCEDURE — 1160F RVW MEDS BY RX/DR IN RCRD: CPT | Performed by: STUDENT IN AN ORGANIZED HEALTH CARE EDUCATION/TRAINING PROGRAM

## 2025-07-09 PROCEDURE — 1125F AMNT PAIN NOTED PAIN PRSNT: CPT | Performed by: STUDENT IN AN ORGANIZED HEALTH CARE EDUCATION/TRAINING PROGRAM

## 2025-07-09 PROCEDURE — 99204 OFFICE O/P NEW MOD 45 MIN: CPT | Performed by: STUDENT IN AN ORGANIZED HEALTH CARE EDUCATION/TRAINING PROGRAM

## 2025-07-09 PROCEDURE — 3078F DIAST BP <80 MM HG: CPT | Performed by: STUDENT IN AN ORGANIZED HEALTH CARE EDUCATION/TRAINING PROGRAM

## 2025-07-09 PROCEDURE — 3008F BODY MASS INDEX DOCD: CPT | Performed by: STUDENT IN AN ORGANIZED HEALTH CARE EDUCATION/TRAINING PROGRAM

## 2025-07-09 RX ORDER — DULOXETIN HYDROCHLORIDE 20 MG/1
20 CAPSULE, DELAYED RELEASE ORAL DAILY
Qty: 30 CAPSULE | Refills: 1 | Status: SHIPPED | OUTPATIENT
Start: 2025-07-09 | End: 2025-09-07

## 2025-07-09 ASSESSMENT — PATIENT HEALTH QUESTIONNAIRE - PHQ9
SUM OF ALL RESPONSES TO PHQ9 QUESTIONS 1 AND 2: 0
1. LITTLE INTEREST OR PLEASURE IN DOING THINGS: NOT AT ALL
2. FEELING DOWN, DEPRESSED OR HOPELESS: NOT AT ALL

## 2025-07-09 ASSESSMENT — PAIN - FUNCTIONAL ASSESSMENT: PAIN_FUNCTIONAL_ASSESSMENT: 0-10

## 2025-07-09 ASSESSMENT — PAIN DESCRIPTION - DESCRIPTORS: DESCRIPTORS: ACHING;NUMBNESS;SHOOTING

## 2025-07-09 ASSESSMENT — PAIN SCALES - GENERAL: PAINLEVEL_OUTOF10: 8

## 2025-07-09 ASSESSMENT — LIFESTYLE VARIABLES: TOTAL SCORE: 0

## 2025-07-09 NOTE — PROGRESS NOTES
[facility] Pain Management  New Patient Office Visit Note 2025    Patient Information: Zuleima Recinos, MRN: [id number], : 1955   Primary Care/Referring Physician: Jaylon Callejas MD, [address] [facility] [address]     Chief Complaint: Bilateral arm pain  History of Pain: Ms. Zuleima Recinos is a 69 y.o. female with a PMHx of HTN, HLD, CKD hx of GI ulcers (20-30 years ago), fibromyalgia who presents for bilateral arm pain.    She reports onset of pain a few year ago with no obvious inciting event. She states that initially she had more issues with her left arm, but more recently is having right sided pain, worse proximally in her arm. She also has some pain in her bilateral neck and shoulder, right worse than left. This pain is fairly constant and worsens with movement of her right arm. She feels a shooting pain down into her pinky finger on both sides. She has some intermittent tingling/numbness in her hands.     Current Pain Medications: OTC Tylenol  Previously Tried Pain Medications: Gabapentin - side effects, Topiramate - no benefit, caused side effects, Duloxetine - only tried for a very short period of time, Celecoxib    Relevant Surgeries: Denies cervical spine or shoulder surgery  Injections: C7/T1 SUSY - no benefit  Physical/Occupational Therapy: She was referred to PT but states it was too expensive to continue    Medications:   Current Outpatient Medications   Medication Instructions    acetaminophen (TYLENOL) 1,000 mg, Every 6 hours PRN    amLODIPine-benazepriL (Lotrel) 10-20 mg capsule 1 capsule, oral, Daily    cholecalciferol (VITAMIN D-3) 50 mcg, Daily    DULoxetine (CYMBALTA) 20 mg, oral, Daily, Do not crush or chew.    famotidine (Pepcid) 40 mg tablet TAKE 1 TABLET(40 MG) BY MOUTH TWICE DAILY    fexofenadine (ALLEGRA) 180 mg, Daily    fluticasone (Flonase) 50 mcg/actuation nasal spray 2 sprays, Daily PRN    hydrocortisone 1 % cream 1 Application, As needed    melatonin 5 mg,  Nightly PRN    potassium chloride CR 20 mEq ER tablet 20 mEq, oral, Daily, Do not crush or chew.    Repatha SureClick 140 mg, subcutaneous, Every 14 days    topiramate (TOPAMAX) 25 mg, oral, Nightly      Allergies: Allergies[1]    Past Medical & Surgical History:  Medical History[2] Surgical History[3]    Family History[4]  Social History     Socioeconomic History    Marital status: Single     Spouse name: Not on file    Number of children: Not on file    Years of education: Not on file    Highest education level: Not on file   Occupational History    Not on file   Tobacco Use    Smoking status: Former     Current packs/day: 0.00     Types: Cigarettes     Quit date:      Years since quittin.5    Smokeless tobacco: Never   Substance and Sexual Activity    Alcohol use: Not Currently    Drug use: Never    Sexual activity: Not on file   Other Topics Concern    Not on file   Social History Narrative    Not on file     Social Drivers of Health     Financial Resource Strain: Not on file   Food Insecurity: Not on file   Transportation Needs: Not on file   Physical Activity: Not on file   Stress: Not on file   Social Connections: Not on file   Intimate Partner Violence: Not on file   Housing Stability: Not on file       Problems, Past medical history, past surgical history, Medications, allergies, social and family history reviewed and as per the electronic medical record from today's encounter    Review of Systems:  CONST: No fever, chills, fatigue, weight changes  EYES: No loss of vision  ENT: No hearing loss, tinnitus  CV: No chest pain, palpitations  RESP: No dyspnea, shortness of breath, cough  GI: No stool incontinence, nausea, vomiting  : No urinary incontinence  MSK: No joint swelling  SKIN: No rash, no hives  NEURO: No headache, dizziness  PSYCH: No anxiety, depression  HEM/LYMPH: No easy bruising or bleeding  All other systems reviewed are negative     Physical Exam:  Vitals: /64   Pulse 70    "Resp 18   Ht (!) 1.549 m (5' 1\")   Wt 73.5 kg (162 lb)   SpO2 98%   BMI 30.61 kg/m²   General: No apparent distress. Alert, appropriate, oriented x 3. Mood and affect normal. Speaking in full sentences.  HENT: Normocephalic, atraumatic. Hearing intact.  Eyes: Extraocular movements grossly intact. Pupils equal and round.   Neck: Supple, trachea midline.  Lungs: Symmetric respiratory excursion on visual exam, nonlabored breathing.  Extremities: No clubbing, cyanosis, or edema noted in arms or legs.  Skin: No rashes, lesions, alopecia noted on back or extremities.   MSK: There is pain but no weakness with right shoulder resisted external rotation and empty can test. Neer's test negative on the right. Speed's test positive on the right  Neuro: Alert and appropriate. Motor strength 5/5 throughout bilateral upper extremities. Sensory intact to light touch bilateral upper extremities. Gait within normal limits. Bulk and tone within normal limits.    Laboratory Data:  The following laboratory data were reviewed during this visit:   Lab Results   Component Value Date    WBC 6.4 01/14/2025    RBC 3.87 (L) 01/14/2025    HGB 12.5 01/14/2025    HCT 37.2 01/14/2025     01/14/2025      No results found for: \"INR\"  Lab Results   Component Value Date    CREATININE 0.94 06/19/2025    HGBA1C 5.9 (H) 06/19/2025       Imaging:  The following imaging impressions were reviewed by me during this visit:    -12/19/23 right shoulder xray shows mild glenohumeral joint OA    I also personally reviewed the images from the above studies myself. These images and my interpretation of them contributed to the management and decision making of the patient's medical plan.    ASSESSMENT:  Ms. Zuleima Recinos is a 69 y.o. female with bilateral arm pain (R>L) that is consistent with:    1. Tendinopathy of right rotator cuff    2. Tendinopathy of right biceps tendon    3. Fibromyalgia        PLAN:    Diagnostics:   - I have some concern for " rotator cuff tendinopathy vs. biceps tendinitis on the right. Recommend an MSK ultrasound to evaluate further.    Physical Therapy and Rehabilitation:     - She is interested in PT but states it is too expensive for her to continue    Psychologically:  - No needs at this time    Medications  - Recommend re-trial of Duloxetine 20 mg daily for her fibromyalgia. She doesn't recall why she stopped this and was only on it for a very short period of time    Duration  - Multiple years    Interventions:  - The exact etiology of her pain is unclear but may represent cervical radiculopathy, right rotator cuff tendinopathy, and/or right biceps tendinitis. Would consider C6/7 SUSY vs. shoulder intervention (subacromial bursa vs. Biceps sheath injection) pending results of MSK ultrasound        Sincerely,  Colt Ley MD  [facility] [place]         [1]   Allergies  Allergen Reactions    Statins-Hmg-Coa Reductase Inhibitors Myalgia    Cefuroxime Unknown     Patient states this was 30 lisa years ago    Evolocumab Rash    Latex Rash   [2]   Past Medical History:  Diagnosis Date    Functional dyspepsia 05/22/2018    Nonulcer dyspepsia    Hypertension     Personal history of other diseases of the circulatory system 12/16/2017    History of hypertension    Personal history of other diseases of the digestive system 09/10/2019    History of esophageal reflux    Personal history of other diseases of the musculoskeletal system and connective tissue 12/16/2017    History of fibromyositis   [3]   Past Surgical History:  Procedure Laterality Date    OTHER SURGICAL HISTORY  02/01/2021    Hysterectomy    TOTAL KNEE ARTHROPLASTY Bilateral    [4] No family history on file.

## 2025-08-05 ENCOUNTER — HOSPITAL ENCOUNTER (OUTPATIENT)
Dept: RADIOLOGY | Facility: HOSPITAL | Age: 70
Discharge: HOME | End: 2025-08-05
Payer: MEDICARE

## 2025-08-05 DIAGNOSIS — M67.921 TENDINOPATHY OF RIGHT BICEPS TENDON: ICD-10-CM

## 2025-08-05 DIAGNOSIS — M67.911 TENDINOPATHY OF RIGHT ROTATOR CUFF: ICD-10-CM

## 2025-08-05 PROCEDURE — 76882 US LMTD JT/FCL EVL NVASC XTR: CPT | Performed by: RADIOLOGY

## 2025-08-05 PROCEDURE — 76881 US COMPL JOINT R-T W/IMG: CPT

## 2025-08-06 DIAGNOSIS — Z12.31 ENCOUNTER FOR SCREENING MAMMOGRAM FOR BREAST CANCER: ICD-10-CM

## 2025-08-07 ENCOUNTER — OFFICE VISIT (OUTPATIENT)
Dept: PAIN MEDICINE | Facility: CLINIC | Age: 70
End: 2025-08-07
Payer: MEDICARE

## 2025-08-07 VITALS
WEIGHT: 162 LBS | OXYGEN SATURATION: 98 % | HEIGHT: 61 IN | RESPIRATION RATE: 18 BRPM | SYSTOLIC BLOOD PRESSURE: 122 MMHG | HEART RATE: 73 BPM | BODY MASS INDEX: 30.58 KG/M2 | DIASTOLIC BLOOD PRESSURE: 64 MMHG

## 2025-08-07 DIAGNOSIS — G89.29 CHRONIC RIGHT SHOULDER PAIN: ICD-10-CM

## 2025-08-07 DIAGNOSIS — S46.219A BICEPS TENDON TEAR: ICD-10-CM

## 2025-08-07 DIAGNOSIS — M75.21 BICEPS TENDINITIS OF RIGHT UPPER EXTREMITY: ICD-10-CM

## 2025-08-07 DIAGNOSIS — M75.101 TEAR OF RIGHT SUPRASPINATUS TENDON: Primary | ICD-10-CM

## 2025-08-07 DIAGNOSIS — M25.511 CHRONIC RIGHT SHOULDER PAIN: ICD-10-CM

## 2025-08-07 PROCEDURE — 20550 NJX 1 TENDON SHEATH/LIGAMENT: CPT | Performed by: STUDENT IN AN ORGANIZED HEALTH CARE EDUCATION/TRAINING PROGRAM

## 2025-08-07 PROCEDURE — 76942 ECHO GUIDE FOR BIOPSY: CPT | Mod: RSC | Performed by: STUDENT IN AN ORGANIZED HEALTH CARE EDUCATION/TRAINING PROGRAM

## 2025-08-07 PROCEDURE — 2500000004 HC RX 250 GENERAL PHARMACY W/ HCPCS (ALT 636 FOR OP/ED): Performed by: STUDENT IN AN ORGANIZED HEALTH CARE EDUCATION/TRAINING PROGRAM

## 2025-08-07 RX ORDER — CELECOXIB 100 MG/1
100 CAPSULE ORAL 2 TIMES DAILY
Qty: 60 CAPSULE | Refills: 1 | Status: SHIPPED | OUTPATIENT
Start: 2025-08-07 | End: 2025-10-06

## 2025-08-07 RX ORDER — TRIAMCINOLONE ACETONIDE 40 MG/ML
40 INJECTION, SUSPENSION INTRA-ARTICULAR; INTRAMUSCULAR ONCE
Status: COMPLETED | OUTPATIENT
Start: 2025-08-07 | End: 2025-08-07

## 2025-08-07 RX ORDER — DIPHENHYDRAMINE HCL 25 MG
25 TABLET ORAL NIGHTLY PRN
COMMUNITY

## 2025-08-07 RX ORDER — BUPIVACAINE HYDROCHLORIDE 5 MG/ML
3 INJECTION, SOLUTION EPIDURAL; INTRACAUDAL; PERINEURAL ONCE
Status: COMPLETED | OUTPATIENT
Start: 2025-08-07 | End: 2025-08-07

## 2025-08-07 RX ADMIN — TRIAMCINOLONE ACETONIDE 40 MG: 40 SUSPENSION INTRA-ARTERIAL; INTRAMUSCULAR at 13:20

## 2025-08-07 RX ADMIN — BUPIVACAINE HYDROCHLORIDE 15 MG: 5 INJECTION, SOLUTION EPIDURAL; INTRACAUDAL; PERINEURAL at 13:21

## 2025-08-07 ASSESSMENT — PAIN DESCRIPTION - DESCRIPTORS: DESCRIPTORS: ACHING

## 2025-08-07 ASSESSMENT — PAIN SCALES - GENERAL
PAINLEVEL_OUTOF10: 9
PAINLEVEL_OUTOF10: 9

## 2025-08-07 ASSESSMENT — PAIN - FUNCTIONAL ASSESSMENT: PAIN_FUNCTIONAL_ASSESSMENT: 0-10

## 2025-08-07 NOTE — PROGRESS NOTES
"[facility]  Follow Up Office Visit Note 2025    Patient Information: Zuleima Recinos, MRN: [id number], : 1955   Primary Care/Referring Physician: Jaylon Callejas MD, [address] [facility] / [facility] [id number]     Chief Complaint: Bilateral arm pain    Interval History: Ms. Recinos presents today for follow up. At her last visit I started Duloxetine and ordered an MSK ultrasound    Today she reports no major changes since last seen. She tried the Duloxetine but it made her feel \"out of it\" so she stopped using it. Her MSK ultrasound shows complete tear of the supraspinatus tendon, extension into the anterior infraspinatus tendon, and tear of the tendon of long head of biceps.  Her worst pain currently is overlying the biceps tendon sheath.  She does report some relief from use of celecoxib in the past and would like to restart this medication    Brief History of Pain: Ms. Zuleima Recinos is a 69 y.o. female with a PMHx of HTN, HLD, CKD, hx of GI ulcers (20-30 years ago), fibromyalgia who presents for bilateral arm pain.    She reports onset of pain a few year ago with no obvious inciting event. She states that initially she had more issues with her left arm, but more recently is having right sided pain, worse proximally in her arm. She also has some pain in her bilateral neck and shoulder, right worse than left. This pain is fairly constant and worsens with movement of her right arm. She feels a shooting pain down into her pinky finger on both sides. She has some intermittent tingling/numbness in her hands.     Current Pain Medications: OTC Tylenol  Previously Tried Pain Medications: Gabapentin - side effects, Topiramate - no benefit, caused side effects, Duloxetine - only tried for a very short period of time, Celecoxib    Relevant Surgeries: Denies cervical spine or shoulder surgery  Injections: C7/T1 SUSY - no benefit  Physical/Occupational Therapy: She was referred to PT but states " it was too expensive to continue    Medications:   Current Outpatient Medications   Medication Instructions    acetaminophen (TYLENOL) 1,000 mg, Every 6 hours PRN    amLODIPine-benazepriL (Lotrel) 10-20 mg capsule 1 capsule, oral, Daily    celecoxib (CELEBREX) 100 mg, oral, 2 times daily    cholecalciferol (VITAMIN D-3) 50 mcg, Daily    diphenhydrAMINE (SOMINEX) 25 mg, Nightly PRN    DULoxetine (CYMBALTA) 20 mg, oral, Daily, Do not crush or chew.    famotidine (Pepcid) 40 mg tablet TAKE 1 TABLET(40 MG) BY MOUTH TWICE DAILY    fexofenadine (ALLEGRA) 180 mg, Daily    fluticasone (Flonase) 50 mcg/actuation nasal spray 2 sprays, Daily PRN    hydrocortisone 1 % cream 1 Application, As needed    melatonin 5 mg, Nightly PRN    potassium chloride CR 20 mEq ER tablet 20 mEq, oral, Daily, Do not crush or chew.    Repatha SureClick 140 mg, subcutaneous, Every 14 days    topiramate (TOPAMAX) 25 mg, oral, Nightly      Allergies: Allergies[1]    Past Medical & Surgical History:  Medical History[2] Surgical History[3]    Family History[4]  Social History     Socioeconomic History    Marital status: Single     Spouse name: Not on file    Number of children: Not on file    Years of education: Not on file    Highest education level: Not on file   Occupational History    Not on file   Tobacco Use    Smoking status: Former     Current packs/day: 0.00     Types: Cigarettes     Quit date:      Years since quittin.6    Smokeless tobacco: Never   Substance and Sexual Activity    Alcohol use: Not Currently    Drug use: Never    Sexual activity: Not on file   Other Topics Concern    Not on file   Social History Narrative    Not on file     Social Drivers of Health     Financial Resource Strain: Not on file   Food Insecurity: Not on file   Transportation Needs: Not on file   Physical Activity: Not on file   Stress: Not on file   Social Connections: Not on file   Intimate Partner Violence: Not on file   Housing Stability: Not on file  "      Problems, Past medical history, past surgical history, Medications, allergies, social and family history reviewed and as per the electronic medical record from today's encounter    Review of Systems:  CONST: No fever, chills, fatigue, weight changes  EYES: No loss of vision  ENT: No hearing loss, tinnitus  CV: No chest pain, palpitations  RESP: No dyspnea, shortness of breath, cough  GI: No stool incontinence, nausea, vomiting  : No urinary incontinence  MSK: No joint swelling  SKIN: No rash, no hives  NEURO: No headache, dizziness  PSYCH: No anxiety, depression  HEM/LYMPH: No easy bruising or bleeding  All other systems reviewed are negative     Physical Exam:  Vitals: /64   Pulse 73   Resp 18   Ht (!) 1.549 m (5' 1\")   Wt 73.5 kg (162 lb)   SpO2 98%   BMI 30.61 kg/m²   General: No apparent distress. Alert, appropriate, oriented x 3. Mood and affect normal. Speaking in full sentences.  HENT: Normocephalic, atraumatic. Hearing intact.  Eyes: Extraocular movements grossly intact. Pupils equal and round.   Neck: Supple, trachea midline.  Lungs: Symmetric respiratory excursion on visual exam, nonlabored breathing.  Extremities: No clubbing, cyanosis, or edema noted in arms or legs.  Skin: No rashes, lesions, alopecia noted on back or extremities.   Neuro: Alert and appropriate. Gait within normal limits. Bulk and tone within normal limits.    Laboratory Data:  The following laboratory data were reviewed during this visit:   Lab Results   Component Value Date    WBC 6.4 01/14/2025    RBC 3.87 (L) 01/14/2025    HGB 12.5 01/14/2025    HCT 37.2 01/14/2025     01/14/2025      No results found for: \"INR\"  Lab Results   Component Value Date    CREATININE 0.94 06/19/2025    HGBA1C 5.9 (H) 06/19/2025       Imaging:  The following imaging impressions were reviewed by me during this visit:    -8/5/25 MSK ultrasound right shoulder shows complete retracted tear of the supraspinatus tendon with likely " full-thickness extension of a component into the anterior infraspinatus tendon. Findings most compatible with torn tendon the long head of the biceps with mild tenosynovitis of its tendon sheath. Mild partial-thickness articular surface tearing of the superior to mid insertional subscapularis tendon. Rotator cuff tendinosis. Mild subacromial subdeltoid and subcoracoid bursitis  -12/19/23 right shoulder xray shows mild glenohumeral joint OA    I also personally reviewed the images from the above studies myself. These images and my interpretation of them contributed to the management and decision making of the patient's medical plan.    ASSESSMENT:  Ms. Zuleima Recinos is a 69 y.o. female with bilateral arm pain (R>L) that is consistent with:    1. Tear of right supraspinatus tendon    2. Biceps tendon tear    3. Chronic right shoulder pain    4. Biceps tendinitis of right upper extremity          PLAN:    Diagnostics:   - I reviewed her recent MSK ultrasound of the right shoulder (see above for details).  No additional diagnostics at this time    Physical Therapy and Rehabilitation:     - She is interested in PT but states it is too expensive for her to continue    Psychologically:  - No needs at this time    Medications  - Discontinue Duloxetine due to side effects  - I discussed that we can restart Celecoxib 100 mg BID, however I did warn her that her kidney function is mildly reduced and we will need to monitor this closely.  She will need to stay very well-hydrated while on this medication.  If she is on the medication for 2 weeks and is not noticing significant pain relief she should discontinue the medication.    Duration  - Multiple years    Interventions:  - The exact etiology of her pain is unclear but may represent cervical radiculopathy, right rotator cuff tendinopathy, and/or right biceps tendinitis.  Clinically her worst pain is most consistent with biceps tendinitis, thus underwent an  ultrasound-guided right biceps tendon sheath injections today, as noted below  - Given the issues noted on the MSK ultrasound of her right shoulder I recommend referral to orthopedics to discuss surgical options to address her shoulder.  Referral provided today.    Joint Injection/Aspiration    Date/Time: 8/7/2025 1:08 PM    Performed by: Colt Ley MD  Authorized by: Colt Ley MD    Consent:     Consent obtained:  Written    Consent given by:  Patient    Risks discussed:  Bleeding, infection and pain    Alternatives discussed:  Alternative treatment  Universal protocol:     Procedure explained and questions answered to patient or proxy's satisfaction: yes      Relevant documents present and verified: yes      Imaging studies available: yes      Immediately prior to procedure, a time out was called: yes      Patient identity confirmed:  Verbally with patient  Location:     Location:  Shoulder  Anesthesia:     Anesthesia method:  Local infiltration    Local anesthetic:  Bupivacaine 0.5% w/o epi  Procedure details:     Preparation: Patient was prepped and draped in usual sterile fashion      Needle gauge:  22 G    Ultrasound guidance: yes      Approach:  Anterior    Steroid injected: yes    Post-procedure details:     Dressing:  Adhesive bandage    Procedure completion:  Tolerated well, no immediate complications  Comments:      Ultrasound guided right biceps tendon sheath injection  After informed written consent was obtained, the patient was placed in the sitting position with the right arm resting in the lap. The correct site and laterality were confirmed with the patient and marked. The skin was prepped with chlorhexidine, allowed to dry for a minimum of 3 minutes, and draped in a sterile fashion.    A linear ultrasound probe (10-12mHz) was positioned over the right shoulder. The bicipital groove and biceps tendon were located with ultrasound. Next, a 25 gauge 1.5 inch needle was used to  anesthetize the skin with 2 mL of bupivacaine 0.5%. Next a 22 gauge 1.5 inch needle was advanced slowly out-of-plane until its tip was visualized under ultrasound to be in the biceps tendon sheath. Next, after negative aspiration, a mixture of 40 mg triamcinolone diluted in 1 mL of bupivacaine 0.5% was injected and the needle was removed.   The skin was cleansed and a sterile bandage was applied. The patient tolerated the procedure well and no complications were encountered.      Performed by: Colt Ley MD  Authorized by: Colt Ley MD        Comments: Ultrasound guidance utilized for this procedure            Sincerely,  Colt Ley MD  Formerly [facility] [place]         [1]   Allergies  Allergen Reactions    Statins-Hmg-Coa Reductase Inhibitors Myalgia    Cefuroxime Unknown     Patient states this was 30 lisa years ago    Evolocumab Rash    Latex Rash   [2]   Past Medical History:  Diagnosis Date    Functional dyspepsia 05/22/2018    Nonulcer dyspepsia    Hypertension     Personal history of other diseases of the circulatory system 12/16/2017    History of hypertension    Personal history of other diseases of the digestive system 09/10/2019    History of esophageal reflux    Personal history of other diseases of the musculoskeletal system and connective tissue 12/16/2017    History of fibromyositis   [3]   Past Surgical History:  Procedure Laterality Date    OTHER SURGICAL HISTORY  02/01/2021    Hysterectomy    TOTAL KNEE ARTHROPLASTY Bilateral    [4] No family history on file.

## 2025-08-22 ENCOUNTER — APPOINTMENT (OUTPATIENT)
Dept: ORTHOPEDIC SURGERY | Facility: HOSPITAL | Age: 70
End: 2025-08-22
Payer: MEDICARE

## 2025-08-22 ENCOUNTER — HOSPITAL ENCOUNTER (OUTPATIENT)
Dept: RADIOLOGY | Facility: HOSPITAL | Age: 70
Discharge: HOME | End: 2025-08-22
Payer: MEDICARE

## 2025-08-22 DIAGNOSIS — R73.03 PREDIABETES: ICD-10-CM

## 2025-08-22 DIAGNOSIS — I10 ESSENTIAL HYPERTENSION: ICD-10-CM

## 2025-08-22 DIAGNOSIS — M25.511 ACUTE PAIN OF RIGHT SHOULDER: ICD-10-CM

## 2025-08-22 DIAGNOSIS — E78.00 PURE HYPERCHOLESTEROLEMIA: ICD-10-CM

## 2025-08-22 DIAGNOSIS — E66.811 CLASS 1 OBESITY WITH BODY MASS INDEX (BMI) OF 34.0 TO 34.9 IN ADULT, UNSPECIFIED OBESITY TYPE, UNSPECIFIED WHETHER SERIOUS COMORBIDITY PRESENT: ICD-10-CM

## 2025-08-22 DIAGNOSIS — M75.121 COMPLETE TEAR OF RIGHT ROTATOR CUFF, UNSPECIFIED WHETHER TRAUMATIC: Primary | ICD-10-CM

## 2025-08-22 PROCEDURE — 99202 OFFICE O/P NEW SF 15 MIN: CPT | Mod: 25

## 2025-08-22 PROCEDURE — 99203 OFFICE O/P NEW LOW 30 MIN: CPT | Performed by: ORTHOPAEDIC SURGERY

## 2025-08-22 PROCEDURE — 2500000004 HC RX 250 GENERAL PHARMACY W/ HCPCS (ALT 636 FOR OP/ED): Performed by: ORTHOPAEDIC SURGERY

## 2025-08-22 PROCEDURE — 73030 X-RAY EXAM OF SHOULDER: CPT | Mod: RT

## 2025-08-22 PROCEDURE — 1159F MED LIST DOCD IN RCRD: CPT | Performed by: ORTHOPAEDIC SURGERY

## 2025-08-22 PROCEDURE — 1160F RVW MEDS BY RX/DR IN RCRD: CPT | Performed by: ORTHOPAEDIC SURGERY

## 2025-08-22 PROCEDURE — 1125F AMNT PAIN NOTED PAIN PRSNT: CPT | Performed by: ORTHOPAEDIC SURGERY

## 2025-08-22 PROCEDURE — 20611 DRAIN/INJ JOINT/BURSA W/US: CPT | Mod: RT | Performed by: ORTHOPAEDIC SURGERY

## 2025-08-22 RX ORDER — EVOLOCUMAB 140 MG/ML
140 INJECTION, SOLUTION SUBCUTANEOUS
Qty: 6 ML | Refills: 3 | Status: SHIPPED | OUTPATIENT
Start: 2025-08-22 | End: 2026-08-22

## 2025-08-22 RX ADMIN — TRIAMCINOLONE ACETONIDE 40 MG: 40 INJECTION, SUSPENSION INTRA-ARTICULAR; INTRAMUSCULAR at 11:00

## 2025-08-22 RX ADMIN — LIDOCAINE HYDROCHLORIDE 3 ML: 10 INJECTION, SOLUTION INFILTRATION; PERINEURAL at 11:00

## 2025-08-22 ASSESSMENT — PAIN - FUNCTIONAL ASSESSMENT: PAIN_FUNCTIONAL_ASSESSMENT: 0-10

## 2025-08-22 ASSESSMENT — ENCOUNTER SYMPTOMS
ARTHRALGIAS: 1
JOINT SWELLING: 0
SORE THROAT: 0
WHEEZING: 0
WOUND: 0
FEVER: 0
FATIGUE: 0
SINUS PAIN: 0
SHORTNESS OF BREATH: 0
CHILLS: 0

## 2025-08-22 ASSESSMENT — PAIN SCALES - GENERAL: PAINLEVEL_OUTOF10: 8

## 2025-08-25 RX ORDER — LIDOCAINE HYDROCHLORIDE 10 MG/ML
3 INJECTION, SOLUTION INFILTRATION; PERINEURAL
Status: COMPLETED | OUTPATIENT
Start: 2025-08-22 | End: 2025-08-22

## 2025-08-25 RX ORDER — TRIAMCINOLONE ACETONIDE 40 MG/ML
40 INJECTION, SUSPENSION INTRA-ARTICULAR; INTRAMUSCULAR
Status: COMPLETED | OUTPATIENT
Start: 2025-08-22 | End: 2025-08-22

## 2025-09-03 ENCOUNTER — APPOINTMENT (OUTPATIENT)
Dept: PRIMARY CARE | Facility: CLINIC | Age: 70
End: 2025-09-03
Payer: MEDICARE

## 2025-09-22 ENCOUNTER — APPOINTMENT (OUTPATIENT)
Dept: AUDIOLOGY | Facility: CLINIC | Age: 70
End: 2025-09-22
Payer: MEDICARE

## 2025-09-22 ENCOUNTER — APPOINTMENT (OUTPATIENT)
Dept: OTOLARYNGOLOGY | Facility: CLINIC | Age: 70
End: 2025-09-22
Payer: MEDICARE

## 2026-08-28 ENCOUNTER — APPOINTMENT (OUTPATIENT)
Dept: PRIMARY CARE | Facility: CLINIC | Age: 71
End: 2026-08-28
Payer: MEDICARE